# Patient Record
Sex: FEMALE | Race: WHITE | Employment: OTHER | URBAN - NONMETROPOLITAN AREA
[De-identification: names, ages, dates, MRNs, and addresses within clinical notes are randomized per-mention and may not be internally consistent; named-entity substitution may affect disease eponyms.]

---

## 2017-01-01 ENCOUNTER — HOSPITAL ENCOUNTER (OUTPATIENT)
Dept: LAB | Age: 67
Discharge: HOME | End: 2017-01-31
Attending: INTERNAL MEDICINE | Admitting: INTERNAL MEDICINE

## 2017-01-13 ENCOUNTER — TELEPHONE (OUTPATIENT)
Age: 67
End: 2017-01-13

## 2017-01-24 LAB
POC INR: 3.8 INDEX
PROTHROMBIN TIME, POC: 46.1 SECONDS (ref 10–14.3)

## 2017-02-01 ENCOUNTER — HOSPITAL ENCOUNTER (OUTPATIENT)
Dept: LAB | Age: 67
Discharge: HOME | End: 2017-02-28
Attending: INTERNAL MEDICINE | Admitting: INTERNAL MEDICINE

## 2017-02-07 LAB
POC INR: 3.3 INDEX
PROTHROMBIN TIME, POC: 39.7 SECONDS (ref 10–14.3)

## 2017-02-21 LAB
POC INR: 3.6 INDEX
PROTHROMBIN TIME, POC: 43.5 SECONDS (ref 10–14.3)

## 2017-02-28 LAB
POC INR: 4.1 INDEX
PROTHROMBIN TIME, POC: 49.2 SECONDS (ref 10–14.3)

## 2017-03-01 ENCOUNTER — HOSPITAL ENCOUNTER (OUTPATIENT)
Dept: LAB | Age: 67
Discharge: HOME | End: 2017-03-31
Attending: INTERNAL MEDICINE | Admitting: INTERNAL MEDICINE

## 2017-03-06 ENCOUNTER — OFFICE VISIT (OUTPATIENT)
Dept: CARDIOLOGY CLINIC | Age: 67
End: 2017-03-06

## 2017-03-06 VITALS
RESPIRATION RATE: 16 BRPM | HEIGHT: 62 IN | BODY MASS INDEX: 51.89 KG/M2 | DIASTOLIC BLOOD PRESSURE: 70 MMHG | SYSTOLIC BLOOD PRESSURE: 112 MMHG | HEART RATE: 80 BPM | WEIGHT: 282 LBS

## 2017-03-06 DIAGNOSIS — G47.33 OSA ON CPAP: ICD-10-CM

## 2017-03-06 DIAGNOSIS — I10 ESSENTIAL HYPERTENSION: ICD-10-CM

## 2017-03-06 DIAGNOSIS — Z98.84 S/P BARIATRIC SURGERY: ICD-10-CM

## 2017-03-06 DIAGNOSIS — Z99.89 OSA ON CPAP: ICD-10-CM

## 2017-03-06 DIAGNOSIS — I48.91 ATRIAL FIBRILLATION, UNSPECIFIED TYPE (HCC): Primary | ICD-10-CM

## 2017-03-06 DIAGNOSIS — E66.01 MORBID OBESITY DUE TO EXCESS CALORIES (HCC): ICD-10-CM

## 2017-03-06 PROCEDURE — 93000 ELECTROCARDIOGRAM COMPLETE: CPT | Performed by: INTERNAL MEDICINE

## 2017-03-06 PROCEDURE — 99214 OFFICE O/P EST MOD 30 MIN: CPT | Performed by: INTERNAL MEDICINE

## 2017-03-06 RX ORDER — ALLOPURINOL 300 MG/1
150 TABLET ORAL 2 TIMES DAILY
COMMUNITY

## 2017-03-07 LAB
POC INR: 3.6 INDEX
PROTHROMBIN TIME, POC: 43.5 SECONDS (ref 10–14.3)

## 2017-03-14 LAB
POC INR: 2.8 INDEX
PROTHROMBIN TIME, POC: 33.3 SECONDS (ref 10–14.3)

## 2017-03-28 ENCOUNTER — TELEPHONE (OUTPATIENT)
Age: 67
End: 2017-03-28

## 2017-03-31 LAB
POC INR: 2.2 INDEX
PROTHROMBIN TIME, POC: 26.5 SECONDS (ref 10–14.3)

## 2017-04-01 ENCOUNTER — HOSPITAL ENCOUNTER (OUTPATIENT)
Dept: LAB | Age: 67
Discharge: HOME | End: 2017-04-30
Attending: INTERNAL MEDICINE | Admitting: INTERNAL MEDICINE

## 2017-04-25 ENCOUNTER — ANTI-COAG VISIT (OUTPATIENT)
Age: 67
End: 2017-04-25

## 2017-04-25 DIAGNOSIS — I48.20 CHRONIC ATRIAL FIBRILLATION (HCC): ICD-10-CM

## 2017-04-25 LAB
INR BLD: 1.9
POC INR: 1.9 INDEX
PROTHROMBIN TIME, POC: 22.2 SECONDS (ref 10–14.3)
PROTIME: 22.2 SECONDS

## 2017-05-01 ENCOUNTER — HOSPITAL ENCOUNTER (OUTPATIENT)
Dept: LAB | Age: 67
Discharge: HOME | End: 2017-05-31
Attending: INTERNAL MEDICINE | Admitting: INTERNAL MEDICINE

## 2017-05-23 ENCOUNTER — TELEPHONE (OUTPATIENT)
Age: 67
End: 2017-05-23

## 2017-05-26 ENCOUNTER — ANTI-COAG VISIT (OUTPATIENT)
Age: 67
End: 2017-05-26

## 2017-05-26 DIAGNOSIS — I48.20 CHRONIC ATRIAL FIBRILLATION (HCC): ICD-10-CM

## 2017-05-26 LAB
INR BLD: 1.5
POC INR: 1.5 INDEX
PROTHROMBIN TIME, POC: 17.9 SECONDS (ref 10–14.3)
PROTIME: 17.9 SECONDS

## 2017-06-01 ENCOUNTER — HOSPITAL ENCOUNTER (OUTPATIENT)
Dept: LAB | Age: 67
Discharge: HOME | End: 2017-06-30
Attending: INTERNAL MEDICINE | Admitting: INTERNAL MEDICINE

## 2017-06-02 ENCOUNTER — ANTI-COAG VISIT (OUTPATIENT)
Age: 67
End: 2017-06-02

## 2017-06-02 DIAGNOSIS — I48.20 CHRONIC ATRIAL FIBRILLATION (HCC): ICD-10-CM

## 2017-06-02 LAB
INR BLD: 1.8
POC INR: 1.8 INDEX
PROTHROMBIN TIME, POC: 21.2 SECONDS (ref 10–14.3)
PROTIME: 21.2 SECONDS

## 2017-06-13 ENCOUNTER — ANTI-COAG VISIT (OUTPATIENT)
Age: 67
End: 2017-06-13

## 2017-06-13 DIAGNOSIS — I48.20 CHRONIC ATRIAL FIBRILLATION (HCC): ICD-10-CM

## 2017-06-13 LAB
INR BLD: 2.9
POC INR: 2.9 INDEX
PROTHROMBIN TIME, POC: 34.2 SECONDS (ref 10–14.3)
PROTIME: 34.2 SECONDS

## 2017-06-16 ENCOUNTER — TELEPHONE (OUTPATIENT)
Age: 67
End: 2017-06-16

## 2017-06-27 ENCOUNTER — ANTI-COAG VISIT (OUTPATIENT)
Age: 67
End: 2017-06-27

## 2017-06-27 DIAGNOSIS — I48.20 CHRONIC ATRIAL FIBRILLATION (HCC): ICD-10-CM

## 2017-06-27 LAB
INR BLD: 3
POC INR: 3 INDEX
PROTHROMBIN TIME, POC: 36.2 SECONDS (ref 10–14.3)
PROTIME: 36.2 SECONDS

## 2017-07-01 ENCOUNTER — HOSPITAL ENCOUNTER (OUTPATIENT)
Dept: LAB | Age: 67
Discharge: HOME | End: 2017-07-31
Attending: INTERNAL MEDICINE | Admitting: INTERNAL MEDICINE

## 2017-07-25 ENCOUNTER — ANTI-COAG VISIT (OUTPATIENT)
Age: 67
End: 2017-07-25

## 2017-07-25 DIAGNOSIS — I48.20 CHRONIC ATRIAL FIBRILLATION (HCC): ICD-10-CM

## 2017-07-25 LAB
INR BLD: 2.8
POC INR: 2.8 INDEX
PROTHROMBIN TIME, POC: 33.2 SECONDS (ref 10–14.3)
PROTIME: 33.2 SECONDS

## 2017-08-01 ENCOUNTER — HOSPITAL ENCOUNTER (OUTPATIENT)
Dept: LAB | Age: 67
Discharge: HOME | End: 2017-08-31
Attending: INTERNAL MEDICINE | Admitting: INTERNAL MEDICINE

## 2017-08-22 ENCOUNTER — ANTI-COAG VISIT (OUTPATIENT)
Age: 67
End: 2017-08-22

## 2017-08-22 DIAGNOSIS — I48.20 CHRONIC ATRIAL FIBRILLATION (HCC): ICD-10-CM

## 2017-08-22 LAB
INR BLD: 7.1
POC INR: 7.1 INDEX
PROTHROMBIN TIME, POC: 85.4 SECONDS (ref 10–14.3)
PROTIME: 85.4 SECONDS

## 2017-08-29 ENCOUNTER — ANTI-COAG VISIT (OUTPATIENT)
Age: 67
End: 2017-08-29

## 2017-08-29 DIAGNOSIS — I48.20 CHRONIC ATRIAL FIBRILLATION (HCC): ICD-10-CM

## 2017-08-29 LAB
INR BLD: 1.3
POC INR: 1.3 INDEX
PROTHROMBIN TIME, POC: 15.5 SECONDS (ref 10–14.3)
PROTIME: 15.5 SECONDS

## 2017-09-01 ENCOUNTER — HOSPITAL ENCOUNTER (OUTPATIENT)
Dept: LAB | Age: 67
Discharge: HOME | End: 2017-09-30
Attending: INTERNAL MEDICINE | Admitting: INTERNAL MEDICINE

## 2017-09-05 ENCOUNTER — ANTI-COAG VISIT (OUTPATIENT)
Age: 67
End: 2017-09-05

## 2017-09-05 DIAGNOSIS — I48.20 CHRONIC ATRIAL FIBRILLATION (HCC): ICD-10-CM

## 2017-09-05 LAB
INR BLD: 2.6
POC INR: 2.6 INDEX
PROTHROMBIN TIME, POC: 31 SECONDS (ref 10–14.3)
PROTIME: 31 SECONDS

## 2017-09-07 ENCOUNTER — OFFICE VISIT (OUTPATIENT)
Dept: SURGERY | Age: 67
End: 2017-09-07

## 2017-09-07 VITALS
SYSTOLIC BLOOD PRESSURE: 134 MMHG | HEART RATE: 71 BPM | DIASTOLIC BLOOD PRESSURE: 78 MMHG | HEIGHT: 60 IN | BODY MASS INDEX: 48.91 KG/M2 | WEIGHT: 249.12 LBS

## 2017-09-07 DIAGNOSIS — S80.12XA HEMATOMA OF LEG, LEFT, INITIAL ENCOUNTER: Primary | ICD-10-CM

## 2017-09-07 PROCEDURE — 99214 OFFICE O/P EST MOD 30 MIN: CPT | Performed by: SURGERY

## 2017-09-07 ASSESSMENT — ENCOUNTER SYMPTOMS
EYE REDNESS: 0
CONSTIPATION: 0
APNEA: 0
STRIDOR: 0
CHOKING: 0
SORE THROAT: 0
COLOR CHANGE: 0
BACK PAIN: 0
RECTAL PAIN: 0
EYE ITCHING: 0
PHOTOPHOBIA: 0
ANAL BLEEDING: 0

## 2017-09-12 ENCOUNTER — ANTI-COAG VISIT (OUTPATIENT)
Age: 67
End: 2017-09-12

## 2017-09-12 DIAGNOSIS — I48.20 CHRONIC ATRIAL FIBRILLATION (HCC): ICD-10-CM

## 2017-09-12 LAB
INR BLD: 3.5
POC INR: 3.5 INDEX
PROTHROMBIN TIME, POC: 41.5 SECONDS (ref 10–14.3)
PROTIME: 41.5 SECONDS

## 2017-09-20 ENCOUNTER — TELEPHONE (OUTPATIENT)
Dept: CARDIOLOGY CLINIC | Age: 67
End: 2017-09-20

## 2017-09-21 ENCOUNTER — HOSPITAL ENCOUNTER (OUTPATIENT)
Dept: SLEEP CENTER | Age: 67
Discharge: HOME | End: 2017-09-21
Attending: INTERNAL MEDICINE | Admitting: INTERNAL MEDICINE

## 2017-09-21 ENCOUNTER — OFFICE VISIT (OUTPATIENT)
Dept: SURGERY | Age: 67
End: 2017-09-21

## 2017-09-21 VITALS
BODY MASS INDEX: 47.12 KG/M2 | DIASTOLIC BLOOD PRESSURE: 73 MMHG | SYSTOLIC BLOOD PRESSURE: 133 MMHG | WEIGHT: 240 LBS | HEART RATE: 71 BPM | HEIGHT: 60 IN

## 2017-09-21 VITALS — WEIGHT: 247 LBS | HEIGHT: 62 IN | BODY MASS INDEX: 45.45 KG/M2

## 2017-09-21 DIAGNOSIS — S80.12XA HEMATOMA OF LEG, LEFT, INITIAL ENCOUNTER: Primary | ICD-10-CM

## 2017-09-21 PROCEDURE — 99214 OFFICE O/P EST MOD 30 MIN: CPT | Performed by: SURGERY

## 2017-09-21 ASSESSMENT — ENCOUNTER SYMPTOMS
COLOR CHANGE: 0
ANAL BLEEDING: 0
SORE THROAT: 0
APNEA: 0
EYE REDNESS: 0
CONSTIPATION: 0
BACK PAIN: 0
CHOKING: 0
RECTAL PAIN: 0
PHOTOPHOBIA: 0
EYE ITCHING: 0
STRIDOR: 0

## 2017-09-22 ENCOUNTER — TELEPHONE (OUTPATIENT)
Age: 67
End: 2017-09-22

## 2017-09-25 ENCOUNTER — HOSPITAL ENCOUNTER (OUTPATIENT)
Dept: PREADMISSION TESTING | Age: 67
Discharge: HOME | End: 2017-09-25
Attending: SURGERY | Admitting: SURGERY

## 2017-09-25 ENCOUNTER — TELEPHONE (OUTPATIENT)
Dept: CARDIOLOGY CLINIC | Age: 67
End: 2017-09-25

## 2017-09-25 VITALS
HEIGHT: 60 IN | SYSTOLIC BLOOD PRESSURE: 127 MMHG | DIASTOLIC BLOOD PRESSURE: 73 MMHG | TEMPERATURE: 97.8 F | BODY MASS INDEX: 48.49 KG/M2 | OXYGEN SATURATION: 98 % | HEART RATE: 85 BPM | RESPIRATION RATE: 16 BRPM | WEIGHT: 247 LBS

## 2017-09-25 LAB
ANION GAP SERPL CALCULATED.3IONS-SCNC: 15 MMOL/L (ref 4–16)
BASOPHILS ABSOLUTE: 0 K/CU MM
BASOPHILS RELATIVE PERCENT: 0.1 % (ref 0–1)
BUN BLDV-MCNC: 34 MG/DL (ref 6–23)
CALCIUM SERPL-MCNC: 9.5 MG/DL (ref 8.3–10.6)
CHLORIDE BLD-SCNC: 104 MMOL/L (ref 99–110)
CO2: 22 MMOL/L (ref 21–32)
CREAT SERPL-MCNC: 1.4 MG/DL (ref 0.6–1.1)
DIFFERENTIAL TYPE: ABNORMAL
EOSINOPHILS ABSOLUTE: 0 K/CU MM
EOSINOPHILS RELATIVE PERCENT: 0 % (ref 0–3)
GFR AFRICAN AMERICAN: 46 ML/MIN/1.73M2
GFR NON-AFRICAN AMERICAN: 38 ML/MIN/1.73M2
GLUCOSE BLD-MCNC: 91 MG/DL (ref 70–140)
HCT VFR BLD CALC: 38.7 % (ref 37–47)
HEMOGLOBIN: 12.1 GM/DL (ref 12.5–16)
IMMATURE NEUTROPHIL %: 0.4 % (ref 0–0.43)
LYMPHOCYTES ABSOLUTE: 1.4 K/CU MM
LYMPHOCYTES RELATIVE PERCENT: 16.2 % (ref 24–44)
MCH RBC QN AUTO: 31.7 PG (ref 27–31)
MCHC RBC AUTO-ENTMCNC: 31.3 % (ref 32–36)
MCV RBC AUTO: 101.3 FL (ref 78–100)
MONOCYTES ABSOLUTE: 0.7 K/CU MM
MONOCYTES RELATIVE PERCENT: 8.1 % (ref 0–4)
NUCLEATED RBC %: 0 %
PDW BLD-RTO: 16.9 % (ref 11.7–14.9)
PLATELET # BLD: 249 K/CU MM (ref 140–440)
PMV BLD AUTO: 10.1 FL (ref 7.5–11.1)
POTASSIUM SERPL-SCNC: 4.9 MMOL/L (ref 3.5–5.1)
RBC # BLD: 3.82 M/CU MM (ref 4.2–5.4)
SEGMENTED NEUTROPHILS ABSOLUTE COUNT: 6.4 K/CU MM
SEGMENTED NEUTROPHILS RELATIVE PERCENT: 75.2 % (ref 36–66)
SODIUM BLD-SCNC: 141 MMOL/L (ref 135–145)
TOTAL IMMATURE NEUTOROPHIL: 0.03 K/CU MM
TOTAL NUCLEATED RBC: 0 K/CU MM
WBC # BLD: 8.6 K/CU MM (ref 4–10.5)

## 2017-09-25 RX ORDER — AMOXICILLIN AND CLAVULANATE POTASSIUM 875; 125 MG/1; MG/1
1 TABLET, FILM COATED ORAL 2 TIMES DAILY
COMMUNITY
End: 2017-10-02 | Stop reason: ALTCHOICE

## 2017-09-25 RX ORDER — METOPROLOL TARTRATE 50 MG/1
25 TABLET, FILM COATED ORAL 2 TIMES DAILY
COMMUNITY
End: 2017-10-10 | Stop reason: ALTCHOICE

## 2017-09-25 RX ORDER — DILTIAZEM HYDROCHLORIDE 180 MG/1
180 CAPSULE, COATED, EXTENDED RELEASE ORAL DAILY
COMMUNITY

## 2017-09-25 RX ORDER — WARFARIN SODIUM 2.5 MG/1
3.5 TABLET ORAL
COMMUNITY
End: 2018-02-01

## 2017-09-25 RX ORDER — WARFARIN SODIUM 2.5 MG/1
TABLET ORAL
COMMUNITY
End: 2018-02-01

## 2017-09-25 RX ORDER — SULFAMETHOXAZOLE AND TRIMETHOPRIM 400; 80 MG/1; MG/1
1 TABLET ORAL 2 TIMES DAILY
COMMUNITY
End: 2017-10-02 | Stop reason: ALTCHOICE

## 2017-09-25 ASSESSMENT — PAIN SCALES - GENERAL: PAINLEVEL_OUTOF10: 0

## 2017-09-26 LAB
EKG ATRIAL RATE: 375 BPM
EKG DIAGNOSIS: NORMAL
EKG Q-T INTERVAL: 396 MS
EKG QRS DURATION: 84 MS
EKG QTC CALCULATION (BAZETT): 427 MS
EKG R AXIS: 31 DEGREES
EKG T AXIS: 166 DEGREES
EKG VENTRICULAR RATE: 70 BPM

## 2017-10-01 ENCOUNTER — HOSPITAL ENCOUNTER (OUTPATIENT)
Dept: LAB | Age: 67
Discharge: HOME | End: 2017-10-31
Attending: INTERNAL MEDICINE | Admitting: INTERNAL MEDICINE

## 2017-10-02 ENCOUNTER — OFFICE VISIT (OUTPATIENT)
Dept: CARDIOLOGY CLINIC | Age: 67
End: 2017-10-02

## 2017-10-02 VITALS
HEIGHT: 62 IN | DIASTOLIC BLOOD PRESSURE: 64 MMHG | BODY MASS INDEX: 43.43 KG/M2 | HEART RATE: 76 BPM | RESPIRATION RATE: 16 BRPM | WEIGHT: 236 LBS | SYSTOLIC BLOOD PRESSURE: 100 MMHG

## 2017-10-02 DIAGNOSIS — Z99.89 OSA ON CPAP: ICD-10-CM

## 2017-10-02 DIAGNOSIS — I10 ESSENTIAL HYPERTENSION: ICD-10-CM

## 2017-10-02 DIAGNOSIS — G47.33 OSA ON CPAP: ICD-10-CM

## 2017-10-02 DIAGNOSIS — E78.2 MIXED HYPERLIPIDEMIA: ICD-10-CM

## 2017-10-02 DIAGNOSIS — Z98.84 S/P BARIATRIC SURGERY: ICD-10-CM

## 2017-10-02 DIAGNOSIS — I48.20 CHRONIC ATRIAL FIBRILLATION (HCC): Primary | ICD-10-CM

## 2017-10-02 PROCEDURE — 99214 OFFICE O/P EST MOD 30 MIN: CPT | Performed by: INTERNAL MEDICINE

## 2017-10-02 RX ORDER — MONTELUKAST SODIUM 10 MG/1
10 TABLET ORAL NIGHTLY
COMMUNITY

## 2017-10-02 NOTE — ASSESSMENT & PLAN NOTE
Patient is to bring us the most recent lab results which are followed by primary care physician until then to continue current dose of statin.

## 2017-10-02 NOTE — PATIENT INSTRUCTIONS
[id number] Score for Atrial Fibrillation Stroke Risk   Risk   Factors  Component Value   C CHF Yes 1   H HTN Yes 1   A2 Age >= 76 No,  (69 y.o.) 0   D DM No 0   S2 Prior Stroke/TIA No 0   V Vascular Disease No 0   A Age 74-69 Yes,  (69 y.o.) 1   Sc Sex female 1    [id number]  Score  4   Score last updated 71/9/00 8:78 PM    Click here for a link to the UpToDate guideline \"Atrial Fibrillation: Anticoagulation therapy to prevent embolization    Disclaimer: Risk Score calculation is dependent on accuracy of patient problem list and past encounter diagnosis. Discontinue Lopressor and continue other current medications. Resume Warfarin at a lower dose of 2.5 mg daily and have PT/INR next Tuesday. Follow up with coumadin clinic. Appropriate prescriptions if needed on this visit are addressed. After visit summery is provided. Questions answered and patient verbalizes understanding. Follow up in office in 2 weeks with a 24 hour Holter,  sooner if needed.

## 2017-10-02 NOTE — PROGRESS NOTES
11/2013); Lithotripsy (9/6/2012); Finger surgery (Left, 2012); Skin graft (2016); parathyroidectomy (2006); other surgical history (01/2016); joint replacement (2011); Skin graft (1960's); Knee arthroscopy (Bilateral, 1990's); Bariatric Surgery (12/2016); [place] tooth extraction; other surgical history (Left, 09/26/2017); and other surgical history (09/26/2017). Social History:   Social History   Substance Use Topics    Smoking status: Passive Smoke Exposure - Never Smoker    Smokeless tobacco: Never Used    Alcohol use No     Family history: family history includes Diabetes in her mother; Heart Failure in her father; Obesity in her brother and sister; Other in her brother. ALLERGIES:  Digoxin and related and Sulfa antibiotics  Prior to Admission medications    Medication Sig Start Date End Date Taking? Authorizing Provider   Multiple Vitamins-Minerals (HAIR SKIN AND NAILS FORMULA) TABS Take by mouth   Yes Historical Provider, MD   montelukast (SINGULAIR) 10 MG tablet Take 10 mg by mouth nightly   Yes Historical Provider, MD   ibuprofen (ADVIL) 200 MG tablet Take 2 tablets by mouth every 6 hours as needed for Pain 9/26/17  Yes Wrens [place], MD   metoprolol tartrate (LOPRESSOR) 50 MG tablet Take 25 mg by mouth 2 times daily   Yes Historical Provider, MD   warfarin (COUMADIN) 2.5 MG tablet Take 2.5 mg by mouth daily On Tues. , Thurs. , Saturday and Sunday   Yes Historical Provider, MD   warfarin (COUMADIN) 2.5 MG tablet Take 3.5 mg by mouth Takes 2.5mg plus 1mg additional on Mon, Wed and Friday   Yes Historical Provider, MD   diltiazem (CARDIZEM CD) 180 MG extended release capsule Take 180 mg by mouth daily   Yes Historical Provider, MD   Cholecalciferol (VITAMIN D3) 5000 units TABS Take 1 tablet by mouth daily   Yes Historical Provider, MD   Biotin 5000 MCG CAPS Take 1 capsule by mouth daily   Yes Historical Provider, MD   Coenzyme Q10-Fish Oil-Vit E (CO-Q 10 OMEGA-3 FISH OIL) CAPS Take 600 mg by mouth daily With [facility]   Yes Historical Provider, MD   allopurinol (ZYLOPRIM) 300 MG tablet Take 300 mg by mouth 2 times daily   Yes Historical Provider, MD   ketoconazole (NIZORAL) 2 % cream Apply topically daily Apply topically daily. Yes Historical Provider, MD   Probiotic Product (FLORAJEN BIFIDOBLEND PO) Take by mouth daily    Yes Historical Provider, MD   naproxen (NAPROSYN) 500 MG tablet Take 500 mg by mouth 2 times daily (with meals)   Yes Historical Provider, MD   aclidinium (TUDORZA PRESSAIR) 400 MCG/ACT AEPB inhaler Inhale 1 puff into the lungs 2 times daily   Yes Historical Provider, MD   BREROMEO ELLIPTA 100-25 MCG/INH AEPB INHALE ONE DOSE BY MOUTH DAILY 6/6/16  Yes Nelia Tejeda MD   albuterol sulfate  (90 BASE) MCG/ACT inhaler Inhale 2 puffs into the lungs every 4 hours as needed for Wheezing or Shortness of Breath 5/2/16  Yes Nelia Tejeda MD   pravastatin (PRAVACHOL) 40 MG tablet Take 20 mg by mouth nightly  2/29/16  Yes Historical Provider, MD   clotrimazole-betamethasone (LOTRISONE) 1-0.05 % cream Apply topically 2 times daily. 2/9/16  Yes Christina Cat MD   azelastine HCl 0.15 % SOLN 2 sprays by Nasal route daily  Patient taking differently: 2 sprays by Nasal route nightly  1/28/16  Yes Nelia Tejeda MD   omeprazole (PRILOSEC) 20 MG capsule Take 20 mg by mouth daily. Yes Historical Provider, MD     Constitutional:  /64 (Site: Left Arm, Position: Sitting, Cuff Size: Large Adult)  Pulse 76  Resp 16  Ht 5' 2\" (1.575 m)  Wt 236 lb (107 kg)  BMI 43.16 kg/m2   Body mass index is 43.16 kg/(m^2). Wt Readings from Last 3 Encounters:   10/02/17 236 lb (107 kg)   09/25/17 247 lb (112 kg)   09/21/17 247 lb (112 kg)       General exam: Obese, awake, alert and oriented and in no acute or apparent distress.     Head and Neck: Normocephalic. Neck is supple . Wears glasses     Carotids: no Bruits. No thyromegaly  Jugular venous pressure: not elevated.   Heart[de-identified] Heart sounds are distant otherwise normal. No murmurs or gallop  Peripheral Pulses: 1+  Extremities: No edema. Left knee incision is healing well and there is no bleeding. The dressing is dry. Chest: Normal  Lungs:Lungs are clear to auscultation and percussion. Abdomen: Soft non tender. Bowel sounds are normal. No organomegaly or ascites. All the ports from surgery healing well  Musculoskeletal: WNL  Skin: Normal in color and texture. No rash    ECG from September 25 showed atrial fibrillation rate was 70 bpm.    LAB REVIEW:  CBC:   Lab Results   Component Value Date    WBC 8.6 09/25/2017    HGB 12.1 09/25/2017    HCT 38.7 09/25/2017     09/25/2017     Lipids:   Lab Results   Component Value Date    CHOL 165 03/24/2014    TRIG 228 03/24/2014    HDL 33 (A) 03/24/2014    LDLCALC 86 03/24/2014    LDLDIRECT 64 11/30/2012     Renal:   Lab Results   Component Value Date    BUN 34 09/25/2017    CREATININE 1.4 09/25/2017     09/25/2017    K 4.9 09/25/2017     PT/INR:   Lab Results   Component Value Date    INR 3.5 09/12/2017       IMPRESSION and RECOMMENDATIONS:      S/P bariatric surgery  Continue to lose weight and feels much better since surgery. We will continue to adjust her medications for her blood pressure and rate control until she plateaus the weight loss. MURTAZA on CPAP  Continue using CPAP    Hyperlipidemia  Patient is to bring us the most recent lab results which are followed by primary care physician until then to continue current dose of statin. Hypertension  Low normal on current medications. We will discontinue Lopressor as she is symptomatic with dizzy spells and I will also obtain a 24-hour Holter monitor to assess rate control off of Lopressor. Atrial fibrillation  Rate is well controlled as per vital vital signs on recent electrocardiogram.  We will evaluated further by 24-hour Holter monitor as she has symptoms of dizzy spells.   She is to resume warfarin and follow up at Coumadin clinic

## 2017-10-02 NOTE — MR AVS SNAPSHOT
After Visit Summary             Giulia Dodson   10/2/2017 3:20 PM   Office Visit    Description:  Female : 1950   Provider:  Michael Espinoza MD   Department:  Cardiology [facility] and Future Appointments         Below is a list of your follow-up and future appointments. This may not be a complete list as you may have made appointments directly with providers that we are not aware of or your providers may have made some for you. Please call your providers to confirm appointments. It is important to keep your appointments. Please bring your current insurance card, photo ID, co-pay, and all medication bottles to your appointment. If self-pay, payment is expected at the time of service. Your To-Do List     Future Appointments Provider Department Dept Phone    10/3/2017 3:45 PM [address] [place] [address] [phone]    10/5/2017 2:00 PM Den Mak MD [address] [phone]    10/9/2017 1:00 PM SCHEDULE, [place] [address] Cardiology [address] [place] [address] [phone]    If this is a fasting lab, please do not eat or drink past midnight other than water. 10/19/2017 2:30 PM Melisa Ge MD [facility] [phone]    Please arrive 15 minutes prior to appointment, bring photo ID and insurance card. 3/5/2018 10:30 AM Michael Espinoza MD Cardiology [address] [place] [address] Drive [phone]    Please arrive 15 minutes prior to appointment, bring photo ID and insurance card. 2018 9:30 AM Michael Espinoza MD Cardiology [address] [place] [address] Drive [phone]    Please arrive 15 minutes prior to appointment, bring photo ID and insurance card. Future Orders Complete By Expires    Holter Monitor 24 Hour [CAR4 Custom]  10/9/2017 (Approximate) 10/2/2018    Follow-Up    Return in about 6 months (around 2018).          Information from Your Visit        Department Name Address Phone Fax    Cardiology [address] [facility] [address] [phone]      You Were Seen for:         Comments    Chronic atrial fibrillation [facility])   [[id number]]         Vital Signs     Blood Pressure Pulse Respirations Height Weight Body Mass Index    100/64 (Site: Left Arm, Position: Sitting, Cuff Size: Large Adult) 76 16 5' 2\" (1.575 m) 236 lb (107 kg) 43.16 kg/m2    Smoking Status                   Passive Smoke Exposure - Never Smoker           Additional Information about your Body Mass Index (BMI)           Your BMI as listed above is considered obese (30 or more). BMI is an estimate of body fat, calculated from your height and weight. The higher your BMI, the greater your risk of heart disease, high blood pressure, type 2 diabetes, stroke, gallstones, arthritis, sleep apnea, and certain cancers. BMI is not perfect. It may overestimate body fat in athletes and people who are more muscular. Even a small weight loss (between 5 and 10 percent of your current weight) by decreasing your calorie intake and becoming more physically active will help lower your risk of developing or worsening diseases associated with obesity. Learn more at: [employer]my.co.uk          Instructions    [id number] Score for Atrial Fibrillation Stroke Risk   Risk   Factors  Component Value   C CHF Yes 1   H HTN Yes 1   A2 Age >= 76 No,  (69 y.o.) 0   D DM No 0   S2 Prior Stroke/TIA No 0   V Vascular Disease No 0   A Age 74-69 Yes,  (69 y.o.) 1   Sc Sex female 1    [id number]  Score  4   Score last updated 32/8/97 9:50 PM    Click here for a link to the UpToDate guideline \"Atrial Fibrillation: Anticoagulation therapy to prevent embolization    Disclaimer: Risk Score calculation is dependent on accuracy of patient problem list and past encounter diagnosis. Discontinue Lopressor and continue other current medications.  Resume Warfarin at a lower dose of 2.5 mg daily and have PT/INR next Tuesday. Follow up with coumadin clinic. Appropriate prescriptions if needed on this visit are addressed. After visit carissa is provided. Questions answered and patient verbalizes understanding. Follow up in office in 2 weeks with a 24 hour Holter,  sooner if needed. Today's Medication Changes          These changes are accurate as of: 10/2/17  4:34 PM.  If you have any questions, ask your nurse or doctor. STOP taking these medications           amoxicillin-clavulanate 875-125 MG per tablet   Commonly known as:  AUGMENTIN   Stopped by: Halley Rossi MD       sulfamethoxazole-trimethoprim 400-80 MG per tablet   Commonly known as:  BACTRIM;SEPTRA   Stopped by: Halley Rossi MD               Your Current Medications Are              Multiple Vitamins-Minerals (HAIR SKIN AND NAILS FORMULA) TABS Take by mouth    montelukast (SINGULAIR) 10 MG tablet Take 10 mg by mouth nightly    ibuprofen (ADVIL) 200 MG tablet Take 2 tablets by mouth every 6 hours as needed for Pain    metoprolol tartrate (LOPRESSOR) 50 MG tablet Take 25 mg by mouth 2 times daily    warfarin (COUMADIN) 2.5 MG tablet Take 2.5 mg by mouth daily On Tues. , Thurs. , Saturday and Sunday    warfarin (COUMADIN) 2.5 MG tablet Take 3.5 mg by mouth Takes 2.5mg plus 1mg additional on Mon, Wed and Friday    diltiazem (CARDIZEM CD) 180 MG extended release capsule Take 180 mg by mouth daily    Cholecalciferol (VITAMIN D3) 5000 units TABS Take 1 tablet by mouth daily    Biotin 5000 MCG CAPS Take 1 capsule by mouth daily    Coenzyme Q10-Fish Oil-Vit E (CO-Q 10 OMEGA-3 FISH OIL) CAPS Take 600 mg by mouth daily With Krill oil    allopurinol (ZYLOPRIM) 300 MG tablet Take 300 mg by mouth 2 times daily    ketoconazole (NIZORAL) 2 % cream Apply topically daily Apply topically daily.     Probiotic Product (FLORAJEN BIFIDOBLEND PO) Take by mouth daily

## 2017-10-02 NOTE — ASSESSMENT & PLAN NOTE
Low normal on current medications. We will discontinue Lopressor as she is symptomatic with dizzy spells and I will also obtain a 24-hour Holter monitor to assess rate control off of Lopressor.

## 2017-10-03 ENCOUNTER — TELEPHONE (OUTPATIENT)
Age: 67
End: 2017-10-03

## 2017-10-03 NOTE — TELEPHONE ENCOUNTER
Patient left voicemail to cancel appointment. Patient states she was off warfarin for 1 week. States she saw Dr. Derik Padilla, who instructed her to take warfarin 2.5mg daily until she returns to clinic. Requested appointment for Tuesday 10/10 per request of Dr. Derik Padilla to be seen within a week of restarting warfarin. Scheduled for 10/10.

## 2017-10-05 ENCOUNTER — OFFICE VISIT (OUTPATIENT)
Dept: SURGERY | Age: 67
End: 2017-10-05

## 2017-10-05 VITALS
HEART RATE: 84 BPM | WEIGHT: 234 LBS | SYSTOLIC BLOOD PRESSURE: 136 MMHG | BODY MASS INDEX: 45.94 KG/M2 | DIASTOLIC BLOOD PRESSURE: 78 MMHG | HEIGHT: 60 IN

## 2017-10-05 DIAGNOSIS — S80.12XA HEMATOMA OF LEG, LEFT, INITIAL ENCOUNTER: Primary | ICD-10-CM

## 2017-10-05 PROCEDURE — 99213 OFFICE O/P EST LOW 20 MIN: CPT | Performed by: SURGERY

## 2017-10-05 NOTE — PROGRESS NOTES
Chief Complaint   Patient presents with    Post-Op Check     L Leg  I&D          SUBJECTIVE:  Patient here for post op visit s/p left leg I&D @ [facility] 9/26/2017. Patient denies pain . No bruising. Wound  is healing. Pain is minimal.  Wounds: min bruising and no discharge. OBJECTIVE:  Physical Exam    Wound well healed without signs of active infection. Suture line intact. Abdomen soft, nontender, nondistended. Culture is negative    ASSESSMENT:  Patient doingwell on this post operative check. Wounds well healed. Sutures removed  1. Hematoma of leg, left, initial encounter        PLAN:  Continue same  Increase activity as tolerated        No orders of the defined types were placed in this encounter. No orders of the defined types were placed in this encounter. Follow Up: Return if symptoms worsen or fail to improve.     Mirela Rosario MD

## 2017-10-05 NOTE — MR AVS SNAPSHOT
After Visit Summary             Shanda Dodson   10/5/2017 2:00 PM   Office Visit    Description:  Female : 1950   Provider:  Charlie Alvarado MD   Department:  [id number] and Future Appointments         Below is a list of your follow-up and future appointments. This may not be a complete list as you may have made appointments directly with providers that we are not aware of or your providers may have made some for you. Please call your providers to confirm appointments. It is important to keep your appointments. Please bring your current insurance card, photo ID, co-pay, and all medication bottles to your appointment. If self-pay, payment is expected at the time of service. Your To-Do List     Future Appointments Provider Department Dept Phone    10/9/2017 1:00 PM SCHEDULE, Jamil Nevarez Cardiology [address] [place] [address] Drive [phone]    If this is a fasting lab, please do not eat or drink past midnight other than water. 10/10/2017 5:00 PM [facility] [phone]    10/19/2017 2:30 PM [address] [facility] MD [facility] [phone]    Please arrive 15 minutes prior to appointment, bring photo ID and insurance card. 2018 9:30 AM Aamir Begum MD Cardiology [address] [place] [address] Drive [phone]    Please arrive 15 minutes prior to appointment, bring photo ID and insurance card. Information from Your Visit        Department     Name Address Phone Fax    [address] [facility] [phone]      Vital Signs     Blood Pressure Pulse Height Weight Body Mass Index Smoking Status    136/78 84 5' (1.524 m) 234 lb (106.1 kg) 45.7 kg/m2 Passive Smoke Exposure - Never Smoker      Additional Information about your Body Mass Index (BMI)           Your BMI as listed above is considered obese (30 or more).  BMI is an estimate of body fat, calculated from your height and weight. The higher your BMI, the greater your risk of heart disease, high blood pressure, type 2 diabetes, stroke, gallstones, arthritis, sleep apnea, and certain cancers. BMI is not perfect. It may overestimate body fat in athletes and people who are more muscular. Even a small weight loss (between 5 and 10 percent of your current weight) by decreasing your calorie intake and becoming more physically active will help lower your risk of developing or worsening diseases associated with obesity. Learn more at: [employer]             Medications and Orders      Your Current Medications Are              Multiple Vitamins-Minerals (HAIR SKIN AND NAILS FORMULA) TABS Take by mouth    montelukast (SINGULAIR) 10 MG tablet Take 10 mg by mouth nightly    ibuprofen (ADVIL) 200 MG tablet Take 2 tablets by mouth every 6 hours as needed for Pain    metoprolol tartrate (LOPRESSOR) 50 MG tablet Take 25 mg by mouth 2 times daily    warfarin (COUMADIN) 2.5 MG tablet Take 2.5 mg by mouth daily On Tues. , Thurs. , Saturday and Sunday    warfarin (COUMADIN) 2.5 MG tablet Take 3.5 mg by mouth Takes 2.5mg plus 1mg additional on Mon, Wed and Friday    diltiazem (CARDIZEM CD) 180 MG extended release capsule Take 180 mg by mouth daily    Cholecalciferol (VITAMIN D3) 5000 units TABS Take 1 tablet by mouth daily    Biotin 5000 MCG CAPS Take 1 capsule by mouth daily    Coenzyme Q10-Fish Oil-Vit E (CO-Q 10 OMEGA-3 FISH OIL) CAPS Take 600 mg by mouth daily With Krill oil    allopurinol (ZYLOPRIM) 300 MG tablet Take 300 mg by mouth 2 times daily    ketoconazole (NIZORAL) 2 % cream Apply topically daily Apply topically daily.     Probiotic Product (FLORAJEN BIFIDOBLEND PO) Take by mouth daily     naproxen (NAPROSYN) 500 MG tablet Take 500 mg by mouth 2 times daily (with meals) aclidinium (TUDORZA PRESSAIR) 400 MCG/ACT AEPB inhaler Inhale 1 puff into the lungs 2 times daily    BREO ELLIPTA 100-25 MCG/INH AEPB INHALE ONE DOSE BY MOUTH DAILY    albuterol sulfate  (90 BASE) MCG/ACT inhaler Inhale 2 puffs into the lungs every 4 hours as needed for Wheezing or Shortness of Breath    pravastatin (PRAVACHOL) 40 MG tablet Take 20 mg by mouth nightly     clotrimazole-betamethasone (LOTRISONE) 1-0.05 % cream Apply topically 2 times daily. azelastine HCl 0.15 % SOLN 2 sprays by Nasal route daily    omeprazole (PRILOSEC) 20 MG capsule Take 20 mg by mouth daily. 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  1950    Tetanus Combination Vaccine (1 - Tdap) 4/10/1970    Mammograms are recommended every 2 years for low/average risk patients aged 48 - 69, and every year for high risk patients per updated national

## 2017-10-06 ENCOUNTER — TELEPHONE (OUTPATIENT)
Dept: CARDIOLOGY CLINIC | Age: 67
End: 2017-10-06

## 2017-10-09 ENCOUNTER — NURSE ONLY (OUTPATIENT)
Dept: CARDIOLOGY CLINIC | Age: 67
End: 2017-10-09

## 2017-10-09 DIAGNOSIS — I48.20 CHRONIC ATRIAL FIBRILLATION (HCC): ICD-10-CM

## 2017-10-09 DIAGNOSIS — I49.9 IRREGULAR HEART RATE: Primary | ICD-10-CM

## 2017-10-09 PROCEDURE — 93225 XTRNL ECG REC<48 HRS REC: CPT | Performed by: INTERNAL MEDICINE

## 2017-10-10 ENCOUNTER — ANTI-COAG VISIT (OUTPATIENT)
Age: 67
End: 2017-10-10

## 2017-10-10 DIAGNOSIS — I48.20 CHRONIC ATRIAL FIBRILLATION (HCC): ICD-10-CM

## 2017-10-10 LAB
INR BLD: 1.2
POC INR: 1.2 INDEX
PROTHROMBIN TIME, POC: 14.1 SECONDS (ref 10–14.3)
PROTIME: 14.1 SECONDS

## 2017-10-12 ENCOUNTER — TELEPHONE (OUTPATIENT)
Dept: CARDIOLOGY CLINIC | Age: 67
End: 2017-10-12

## 2017-10-12 PROCEDURE — 93227 XTRNL ECG REC<48 HR R&I: CPT | Performed by: INTERNAL MEDICINE

## 2017-10-17 ENCOUNTER — ANTI-COAG VISIT (OUTPATIENT)
Age: 67
End: 2017-10-17

## 2017-10-17 DIAGNOSIS — I48.20 CHRONIC ATRIAL FIBRILLATION (HCC): ICD-10-CM

## 2017-10-17 LAB
INR BLD: 1.9
POC INR: 1.9 INDEX
PROTHROMBIN TIME, POC: 22.8 SECONDS (ref 10–14.3)
PROTIME: 22.8 SECONDS

## 2017-10-17 NOTE — PROGRESS NOTES
Patient verifies current dosing regimen as listed above. Patient denies any missed or extra doses. Patient denies any unusual bruising/bleeding/swelling/SOB. Patient denies changes in diet involving vitamin K. Patient denies any changes in prescription/OTC/herbal medications. Patient denies recent falls. Increase dose by ~5% to warfarin 3.5mg daily except 2.5mg MWF and return to clinic in 2 weeks. Dosing reminder sheet given with phone number, appointment date, and time.

## 2017-10-31 ENCOUNTER — ANTI-COAG VISIT (OUTPATIENT)
Age: 67
End: 2017-10-31

## 2017-10-31 DIAGNOSIS — I48.20 CHRONIC ATRIAL FIBRILLATION (HCC): ICD-10-CM

## 2017-10-31 LAB
INR BLD: 3.6
POC INR: 3.6 INDEX
PROTHROMBIN TIME, POC: 43.7 SECONDS (ref 10–14.3)
PROTIME: 43.7 SECONDS

## 2017-10-31 NOTE — PROGRESS NOTES
Patient verifies current dosing regimen as listed above. Patient denies any missed or extra doses. Patient denies any unusual bruising/bleeding/swelling/SOB. Patient denies changes in diet involving vitamin K. Patient denies any changes in OTC/herbal medications. Patient completed Medrol Dose Baldomero for hives. Likely reason INR supratherapeutic. Patient denies recent falls. Take warfarin 1.5mg x 1 then continue warfarin 3.5mg daily except 2.5mg MWF and return to clinic in 2.5 weeks. Dosing reminder sheet given with phone number, appointment date, and time.

## 2017-11-01 ENCOUNTER — HOSPITAL ENCOUNTER (OUTPATIENT)
Dept: LAB | Age: 67
Discharge: HOME | End: 2017-11-30
Attending: INTERNAL MEDICINE | Admitting: INTERNAL MEDICINE

## 2017-11-14 ENCOUNTER — TELEPHONE (OUTPATIENT)
Age: 67
End: 2017-11-14

## 2017-11-14 NOTE — TELEPHONE ENCOUNTER
Patient called to let clinic know she is now on Cipro, but she is unable to attend her previously scheduled appointment 11/17. Patient needs to reschedule. Called patient back on home and cell numbers. No answer. Left .     Scar Vernon, PharmD, [facility]  11/14/2017  4:54 PM

## 2017-11-21 ENCOUNTER — ANTI-COAG VISIT (OUTPATIENT)
Age: 67
End: 2017-11-21

## 2017-11-21 DIAGNOSIS — I48.20 CHRONIC ATRIAL FIBRILLATION (HCC): ICD-10-CM

## 2017-11-21 LAB
INR BLD: 2.5
POC INR: 2.5 INDEX
PROTHROMBIN TIME, POC: 30.4 SECONDS (ref 10–14.3)
PROTIME: 30.4 SECONDS

## 2017-12-01 ENCOUNTER — HOSPITAL ENCOUNTER (OUTPATIENT)
Dept: LAB | Age: 67
Discharge: HOME | End: 2017-12-31
Attending: INTERNAL MEDICINE | Admitting: INTERNAL MEDICINE

## 2017-12-19 ENCOUNTER — ANTI-COAG VISIT (OUTPATIENT)
Age: 67
End: 2017-12-19

## 2017-12-19 DIAGNOSIS — I48.20 CHRONIC ATRIAL FIBRILLATION (HCC): ICD-10-CM

## 2017-12-19 LAB
INR BLD: 2.1
POC INR: 2.1 INDEX
PROTHROMBIN TIME, POC: 24.8 SECONDS (ref 10–14.3)
PROTIME: 24.8 SECONDS

## 2018-01-01 ENCOUNTER — HOSPITAL ENCOUNTER (OUTPATIENT)
Dept: LAB | Age: 68
Discharge: HOME | End: 2018-01-31
Attending: INTERNAL MEDICINE | Admitting: INTERNAL MEDICINE

## 2018-01-16 ENCOUNTER — ANTI-COAG VISIT (OUTPATIENT)
Age: 68
End: 2018-01-16

## 2018-01-16 ENCOUNTER — TELEPHONE (OUTPATIENT)
Age: 68
End: 2018-01-16

## 2018-01-16 DIAGNOSIS — I48.20 CHRONIC ATRIAL FIBRILLATION (HCC): ICD-10-CM

## 2018-01-16 LAB
INR BLD: 2.3
POC INR: 2.3 INDEX
PROTHROMBIN TIME, POC: 27.9 SECONDS (ref 10–14.3)
PROTIME: 27.9 SECONDS

## 2018-01-16 NOTE — PROGRESS NOTES
Patient verifies current dosing regimen as listed above. Patient denies any missed or extra doses. Patient denies any unusual bruising/bleeding/swelling/SOB. Patient denies changes in diet involving vitamin K. Patient denies any changes in prescription/OTC/herbal medications. Patient reports falling over dog, but denies hitting head. Continue warfarin 3.5mg daily except 2.5mg on MWF and return to clinic in 4 weeks. Dosing reminder sheet given with phone number, appointment date, and time.

## 2018-01-19 PROBLEM — L02.416 ABSCESS OF KNEE, LEFT: Status: ACTIVE | Noted: 2018-01-19

## 2018-02-01 ENCOUNTER — HOSPITAL ENCOUNTER (OUTPATIENT)
Dept: LAB | Age: 68
Discharge: HOME | End: 2018-02-28
Attending: INTERNAL MEDICINE | Admitting: INTERNAL MEDICINE

## 2018-02-06 ENCOUNTER — TELEPHONE (OUTPATIENT)
Dept: CARDIOLOGY CLINIC | Age: 68
End: 2018-02-06

## 2018-02-06 ENCOUNTER — TELEPHONE (OUTPATIENT)
Age: 68
End: 2018-02-06

## 2018-02-06 NOTE — TELEPHONE ENCOUNTER
Dr. Jonnathan Yeager completed cardiac clearance form and cleared patient to proceed with her procedure. Form faxed to [facility] at [phone]; see copy of form attached.

## 2018-02-08 ENCOUNTER — HOSPITAL ENCOUNTER (OUTPATIENT)
Dept: NUCLEAR MEDICINE | Age: 68
Discharge: HOME | End: 2018-02-08
Attending: SURGERY | Admitting: SURGERY

## 2018-02-08 DIAGNOSIS — M25.062 HEMARTHROSIS INVOLVING KNEE JOINT, LEFT: ICD-10-CM

## 2018-02-08 DIAGNOSIS — M25.562 PAIN IN LEFT KNEE: ICD-10-CM

## 2018-02-08 DIAGNOSIS — M25.562 LEFT KNEE PAIN, UNSPECIFIED CHRONICITY: ICD-10-CM

## 2018-02-08 DIAGNOSIS — M25.062 HEMARTHROSIS OF LEFT KNEE: ICD-10-CM

## 2018-02-08 RX ORDER — TC 99M MEDRONATE 20 MG/10ML
25 INJECTION, POWDER, LYOPHILIZED, FOR SOLUTION INTRAVENOUS
Status: COMPLETED | OUTPATIENT
Start: 2018-02-08 | End: 2018-02-08

## 2018-02-08 RX ADMIN — TC 99M MEDRONATE 25 MILLICURIE: 20 INJECTION, POWDER, LYOPHILIZED, FOR SOLUTION INTRAVENOUS at 09:30

## 2018-06-13 ENCOUNTER — HOSPITAL ENCOUNTER (OUTPATIENT)
Dept: OTHER | Age: 68
Discharge: HOME | End: 2018-06-13
Attending: UROLOGY | Admitting: UROLOGY

## 2018-06-18 LAB
STONE COMPOSITION: NORMAL
STONE DESCRIPTION: NORMAL
STONE MASS: 511
STONE NUMBER: NORMAL
STONE SIZE: NORMAL

## 2019-02-15 LAB
AVERAGE GLUCOSE: NORMAL
HBA1C MFR BLD: 5.6 %

## 2019-03-21 ENCOUNTER — OFFICE VISIT (OUTPATIENT)
Dept: PHYSICAL MEDICINE AND REHAB | Age: 69
End: 2019-03-21
Payer: MEDICARE

## 2019-03-21 DIAGNOSIS — R20.2 PARESTHESIA OF BOTH FEET: ICD-10-CM

## 2019-03-21 DIAGNOSIS — M54.17 LUMBOSACRAL RADICULOPATHY AT S1: Primary | ICD-10-CM

## 2019-03-21 DIAGNOSIS — G57.42 LEFT TIBIAL NEUROPATHY: ICD-10-CM

## 2019-03-21 DIAGNOSIS — M54.42 CHRONIC BILATERAL LOW BACK PAIN WITH BILATERAL SCIATICA: ICD-10-CM

## 2019-03-21 DIAGNOSIS — M54.41 CHRONIC BILATERAL LOW BACK PAIN WITH BILATERAL SCIATICA: ICD-10-CM

## 2019-03-21 DIAGNOSIS — G89.29 CHRONIC BILATERAL LOW BACK PAIN WITH BILATERAL SCIATICA: ICD-10-CM

## 2019-03-21 PROCEDURE — 95886 MUSC TEST DONE W/N TEST COMP: CPT | Performed by: PHYSICAL MEDICINE & REHABILITATION

## 2019-03-21 PROCEDURE — 95910 NRV CNDJ TEST 7-8 STUDIES: CPT | Performed by: PHYSICAL MEDICINE & REHABILITATION

## 2020-07-23 ENCOUNTER — HOSPITAL ENCOUNTER (OUTPATIENT)
Dept: PHYSICAL THERAPY | Age: 70
Setting detail: THERAPIES SERIES
Discharge: HOME | End: 2020-07-23
Payer: MEDICARE

## 2020-07-23 PROCEDURE — 97110 THERAPEUTIC EXERCISES: CPT

## 2020-07-23 PROCEDURE — 97162 PT EVAL MOD COMPLEX 30 MIN: CPT

## 2020-07-23 NOTE — PROGRESS NOTES
[facility]  [address] [place] [id number]  Phone: [phone] Fax: [phone]    PHYSICAL THERAPY INITIAL ASSESSMENT      Date: 2020  Patient Name: Mercedes Dakin   : 1950  Referred by: Maggy Holguin MD  Reason for Referral: infection  PT Impression:   Insurance: [facility] Medicare  Restrictions/Precautions: fall risk    Subjective   Chart Reviewed: Yes   Patient assessed for rehabilitation services?: Yes   Family / Caregiver Present: no  Follows Commands: Within Functional Limits   Date of onset:  \"about a month ago\"  Subjective: Pt reports she was laying in bed and her leg would not straighten out. She reports she had a TKA 2011 and got an infection last year and the proshtesis was removed and a spacer placed for 8 weeks. She reports they put it back in and it immediately was infected again. She reports she was on a high dose of antibiotic and it worked for a year, but now the infection is back. Current Situation: She reports they increased her antibiotic recently and it has been feeling better now. Observation: Pt demonstrated a slightly antalgic gait. Medication: see list in chart. Pain Screening   Patient Currently in Pain:   Pain Assessment: 0-10   Pain Level: 4/10    Worst pain: 8/10   Best pain:   4/10   Sensation: WNL per pt report    Vision/Hearing   Vision: impaired. Pt wears glasses all the time. Hearing: impaired L>R    Home Living  Lives With:   Type of Home: House  Home Layout:  2 story  Toilet: standard, with sink nearby. 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 Pt reports she uses a stool. 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 Prior level of function:  Pt reports she did better on the steps and had more ROM.    Patient goals: pt reports she would like to be able to ambulate up and down steps reciprocally. Orientation: WNL    Objective  AROM  LE  Knee:    Right Left   Knee flexion      110  degrees     100   degrees   Knee extension     5 degree hyper-extension      5 degree hyper-extension     Strength LE  Hip:   Right Left   Hip flexion         4+/5        4-/5   Hip abd        5/5        5/5   Hip add        4/5        4/5     Knee:    Right Left   Knee flexion        5/5        4+/5   Knee extension        5/5        4+/5     Ankle:   Right Left   Ankle DF         5/5        4+/5     Bed Mobility: mod I    Transfers  Sit to Stand: Independent  Stand to sit: Independent    Ambulation  Ambulation?: Yes  WB Status: FWB  Surface: level tile  Device: none  Assistance: Indpendent  Quality of Gait: Pt demonstrated slightly antalgic gait, but was steady. Distance: 39' x2  Stairs?: not performed  Palpation: not performed    Additional Tests: LEFs: 41/80 = 51.25% ability = 48.75% disability     Assessment   Decreased functional mobility ; Decreased strength;Decreased endurance;Decreased high-level IADLs;Decreased ADL status; Decreased ROM; Assessment: Pt is a pleasant 73 yo female who would benefit from skilled PT to address decreased ROM, strength, balance, and functional mobility as well as increased pain. Prognosis: Good  Discharge Recommendations: Patient would benefit from additional therapy;Continue to assess pending progress,   Requires PT Follow Up: Yes  Activity Tolerance: Patient Tolerated treatment well. Treatment Administered: see flowsheet  Patient Education: see flowsheet    Plan   Plan of care initiated  Frequency and duration of tx:  2x/week x8+ weeks  Barriers include: none  Treatment:  1. Therapeutic exercises including ROM, PREs, stretching, strengthening, and stability  2. Therapeutic activity  3. Gait training  4. Stair training  5. Neuro re-ed  6. Coordination training and body awareness  7.  Positioning and postural awareness  8. Modalities including e-stim, ultrasound, heat, cold, and foam roll  9. Manual therapy including: STM, MFR, and TPR  10. HEP and education    Goals  Short term goals to be achieved by August 23, 2020:  Short term goal 1: Pt will report compliance with current HEP as prescribed in order to improve ROM and strength. Short term goal 2: Pt will demonstrate AROM L knee flexion greater than or equal to 110 degrees in order to improve ROM. Short term goal 3: Pt will demonstrate L hip flexion strength greater than or equal to 4+/5 in order to improve strength. Short term goal 4: Pt will demonstrate the ability to safely ambulate up the steps with a reciprocal gait in 2/2 trials with 1 HHA and mod I. Short term goal 5: Pt will report worst pain no more than 5/10 in the last week in order to improve quality of life. Long term goals to be achieved by September 23, 2020:   Long term goal 1: Pt will demonstrate I with current HEP as prescribed in order to improve ROM and strength. Long term goal 2: Pt will demonstrate B LE strength greater than or equal to 4+/5 in order to improve strength. Long term goal 3: Pt will demonstrate the ability to safely and reciprocally ambulate down the steps in 2/2 trials in order to improve stair safety. Long term goal 4: Pt will demonstrate a score of no more than 35% disability on the Oswestry in order to improve quality of life. Note: Goals, frequency, plan, and recommendations will be updated as needed. Goals and treatment plan discussed with family and mutually agreed upon. YES   Will discharge patient when therapy goals have been met or when therapy is no longer deemed necessary. This plan was reviewed with the patient/family and they were in agreement. Electronically signed by:  Zoë Mejia [id number] Date: 7/23/2020, Time: 6:53 AM      I certify that the above patient is under my care and requires the above skilled services.

## 2020-07-23 NOTE — FLOWSHEET NOTE
Outpatient Physical Therapy  [place]           [x] Phone: [phone]   Fax: [phone]  [facility]           [] Phone: [phone]   Fax: [phone]        Physical Therapy Daily Treatment Note  Date:  2020    Patient Name:  Coleen Pitt    :  1950  MRN: [id number]  Restrictions/Precautions:  fall risk  Diagnosis:      Date of Injury/Surgery: unknown  Treatment Diagnosis:      Insurance/Certification information:   [facility] Medicare  Referring Physician:   Dr Edouard Null MD  Plan of care signed (Y/N):  eval faxed  Outcome Measure: LEFs:  = 51.25% ability = 48.75% disability   Visit# / total visits:   1/10 then PN  Pain level: 4/10   Goals:   See eval    Subjective:  See eval     Any changes in Ambulatory Summary Sheet? None    Objective:  See eval   Prior to today's treatment session, patient was screened for signs and symptoms related to COVID-19 including but not limited to verbally answering questions related to feeling ill, cough, or SOB, along with taking temperature via forehead thermometer. Patient presented with all negative signs and symptoms and had no fever >100 degrees Fahrenheit this date. Exercises: (No more than 4 columns)   Exercise/Equipment Date: 2020 Date Date           WARM UP                     TABLE      SAQ 2x10 with 5\" hold 1x with 2# wt and 1x with 4# wt     SLR 2x10 with 4# wt     TKE 2x15      4 way hip 1x12 B LE      HS curls    1x12 L LE     Heel raises 1x10 B LE                 STANDING                                                     PROPRIOCEPTION                                    MODALITIES                    Other Therapeutic Activities/Education:  Pt educated on POC and what to expect. Home Exercise Program:  Pt educated to perform SLR and TKE at home with red t-band.      Manual Treatments:  none    Modalities:  None    Communication with other providers:  eval faxed    Assessment:  (Response towards treatment session) (Pain Rating) 0/10   Pt would benefit from skilled PT to address decreased ROM, strength, balance, and functional mobility.      Plan for Next Session:  Continue with LE strengthening    Time In / Time Out:    9:15-10:15     Timed Code/Total Treatment Minutes:  60/1 mod eval, 3 therapeutic exercise    Next Progress Note due:  8/23/2020    Plan of Care Interventions:  [x] Therapeutic Exercise  [] Modalities:  [x] Therapeutic Activity     [] Ultrasound  [] Estim  [x] Gait Training      [] Cervical Traction [] Lumbar Traction  [x] Neuromuscular Re-education    [] Cold/hotpack [] Iontophoresis   [x] Instruction in HEP      [] Vasopneumatic   [] Dry Needling    [] Manual Therapy               [] Aquatic Therapy              Electronically signed by:  Luis Alfredo Toro PT DPT [id number]  7/23/2020, 9:54 AM

## 2020-07-28 ENCOUNTER — HOSPITAL ENCOUNTER (OUTPATIENT)
Dept: PHYSICAL THERAPY | Age: 70
Setting detail: THERAPIES SERIES
Discharge: HOME | End: 2020-07-28
Payer: MEDICARE

## 2020-07-28 PROCEDURE — 97110 THERAPEUTIC EXERCISES: CPT

## 2020-07-28 NOTE — FLOWSHEET NOTE
Outpatient Physical Therapy  [place]           [x] Phone: [phone]   Fax: [phone]  [facility]           [] Phone: [phone]   Fax: [phone]        Physical Therapy Daily Treatment Note  Date:  2020    Patient Name:  Merced Dugan    :  1950  MRN: [id number]  Restrictions/Precautions:  fall risk  Diagnosis:      Date of Injury/Surgery: unknown  Treatment Diagnosis:      Insurance/Certification information:   [facility] Medicare  Referring Physician:   Dr Shi Dove MD  Plan of care signed (Y/N):  eval faxed  Outcome Measure: LEFs: 4180 = 51.25% ability = 48.75% disability   Visit# / total visits:   2/10 then PN  Pain level: 4/10   Goals:   See eval    Subjective:   Patient reports of 10 pain upon arrival and reports her back is bothering her this morning due to pulling weeds in her garden yesterday. Any changes in Ambulatory Summary Sheet? None    Objective:   Compliance with HEP  Prior to today's treatment session, patient was screened for signs and symptoms related to COVID-19 including but not limited to verbally answering questions related to feeling ill, cough, or SOB, along with taking temperature via forehead thermometer. Patient presented with all negative signs and symptoms and had no fever >100 degrees Fahrenheit this date.    Exercises: (No more than 4 columns)   Exercise/Equipment Date: 2020 Date 2020 Date           WARM UP      Nustep     S8/A9 Lv3 7'          TABLE      SAQ 2x10 with 5\" hold 1x with 2# wt and 1x with 4# wt Med roll 4# 2x10 B    SLR 2x10 with 4# wt 4# 2x10 R 15x L    TKE 2x15  RTB 50x B    4 way hip 1x12 B LE  2x10 B    HS curls    1x12 L LE 2x10 B    Heel raises 1x10 B LE 2x10 B    Bridge  10x    Clamshells  Supine RTB 2x10 2\"    Ball squeeze  2x10 2\"                            STANDING                                                     PROPRIOCEPTION                                    MODALITIES                    Other Therapeutic

## 2020-07-31 ENCOUNTER — HOSPITAL ENCOUNTER (OUTPATIENT)
Dept: PHYSICAL THERAPY | Age: 70
Setting detail: THERAPIES SERIES
Discharge: HOME | End: 2020-07-31
Payer: MEDICARE

## 2020-07-31 PROCEDURE — 97110 THERAPEUTIC EXERCISES: CPT

## 2020-07-31 NOTE — FLOWSHEET NOTE
Outpatient Physical Therapy  [place]           [x] Phone: [phone]   Fax: [phone]  [facility]           [] Phone: [phone]   Fax: [phone]        Physical Therapy Daily Treatment Note  Date:  2020    Patient Name:  Dara Weller    :  1950  MRN: [id number]  Restrictions/Precautions:  fall risk  Diagnosis:      Date of Injury/Surgery: unknown  Treatment Diagnosis:      Insurance/Certification information:   [facility] Medicare  Referring Physician:   Dr Dennis Barrett MD  Plan of care signed (Y/N):  paolaal faxed  Outcome Measure: LEFs: 4180 = 51.25% ability = 48.75% disability   Visit# / total visits:   0/10 then PN  Pain level: 0/10   Goals:   See eval    Subjective:   Patient reports of 0/10 pain upon arrival and reports she was able to stand and do some suly with minimal pain in her back. Any changes in Ambulatory Summary Sheet? None    Objective:   Standing longer with slight decrease in pain       Prior to today's treatment session, patient was screened for signs and symptoms related to COVID-19 including but not limited to verbally answering questions related to feeling ill, cough, or SOB, along with taking temperature via forehead thermometer. Patient presented with all negative signs and symptoms and had no fever >100 degrees Fahrenheit this date.    Exercises: (No more than 4 columns)   Exercise/Equipment Date: 2020 Date 2020 Date 2020           WARM UP      Nustep     S8/A9 Lv3 7' S8/A9 Lv3 7'         TABLE      SAQ 2x10 with 5\" hold 1x with 2# wt and 1x with 4# wt Med roll 4# 2x10 B Med roll 4# 2x10 B   SLR 2x10 with 4# wt 4# 2x10 R 15x L 4# 2x10 B   TKE 2x15  RTB 50x B RTB 2x15 B   4 way hip 1x12 B LE  2x10 B 2x10 B   HS curls    1x12 L LE 2x10 B 2x10 B   Heel raises 1x10 B LE 2x10 B 2x10 B   Bridge  10x 10x   Clamshells  Supine RTB 2x10 2\" Supine RTB 2x10 2\"   Ball squeeze  2x10 2\" 2x10 2\"                           STANDING PROPRIOCEPTION                                    MODALITIES                    Other Therapeutic Activities/Education:  Pt educated on POC and what to expect. Home Exercise Program:  Pt educated to perform SLR and TKE at home with red t-band. Manual Treatments:  none    Modalities:  None    Communication with other providers:  eval faxed    Assessment:  (Response towards treatment session) (Pain Rating) 3/10 at end of treatment in the anterior L knee and did well with all exs today  Pt would benefit from skilled PT to address decreased ROM, strength, balance, and functional mobility.      Plan for Next Session:  Continue with LE strengthening    Time In / Time Out:    1038/1128     Timed Code/Total Treatment Minutes:  50te      Next Progress Note due:  8/23/2020    Plan of Care Interventions:  [x] Therapeutic Exercise  [] Modalities:  [x] Therapeutic Activity     [] Ultrasound  [] Estim  [x] Gait Training      [] Cervical Traction [] Lumbar Traction  [x] Neuromuscular Re-education    [] Cold/hotpack [] Iontophoresis   [x] Instruction in HEP      [] Vasopneumatic   [] Dry Needling    [] Manual Therapy               [] Aquatic Therapy              Electronically signed by:  Barbara Skinner PTA  7/31/2020, 10:38 AM

## 2020-08-04 ENCOUNTER — HOSPITAL ENCOUNTER (OUTPATIENT)
Dept: PHYSICAL THERAPY | Age: 70
Setting detail: THERAPIES SERIES
Discharge: HOME | End: 2020-08-04
Payer: MEDICARE

## 2020-08-04 PROCEDURE — 97112 NEUROMUSCULAR REEDUCATION: CPT

## 2020-08-04 PROCEDURE — 97110 THERAPEUTIC EXERCISES: CPT

## 2020-08-04 NOTE — FLOWSHEET NOTE
Outpatient Physical Therapy  [place]           [x] Phone: [phone]   Fax: [phone]  [place]           [] Phone: [phone]   Fax: [phone]        Physical Therapy Daily Treatment Note  Date:  2020    Patient Name:  Arsenio Rashid    :  1950  MRN: [id number]  Restrictions/Precautions:  fall risk  Diagnosis:      Date of Injury/Surgery: unknown  Treatment Diagnosis:      Insurance/Certification information:   [facility] Medicare  Referring Physician:   Dr Kaushik Gomes MD  Plan of care signed (Y/N):  eval faxed  Outcome Measure: LEFs: 4180 = 51.25% ability = 48.75% disability   Visit# / total visits:  7/10 then PN  Pain level: 0/10   Goals:   See eval    Subjective:   Patient reports of 0/10 pain upon arrival and reports she was able to stand and do some suly yesterday but when she went to bed is when she noticed it and rated it at a 3-4/10. Any changes in Ambulatory Summary Sheet? None    Objective:  Pain at worst in the last 7 days 4-5/10 and at best 0/10. Prior to today's treatment session, patient was screened for signs and symptoms related to COVID-19 including but not limited to verbally answering questions related to feeling ill, cough, or SOB, along with taking temperature via forehead thermometer. Patient presented with all negative signs and symptoms and had no fever >100 degrees Fahrenheit this date.    Exercises: (No more than 4 columns)   Exercise/Equipment Date 2020 Date 2020           WARM UP      Nustep    S8/A9 Lv3 7' S8/A9 Lv3 7' S8/A9 Lv4 8'         TABLE      SAQ Med roll 4# 2x10 B Med roll 4# 2x10 B Med roll 4# 2x10 B   SLR 4# 2x10 R 15x L 4# 2x10 B 4# 2x10 B   TKE RTB 50x B RTB 2x15 B RTB 2x15   4 way hip 2x10 B 2x10 B 2x10 B   HS curls    2x10 B 2x10 B 2x10 B   Heel raises 2x10 B 2x10 B 20x B   Bridge 10x 10x 15x   Clamshells Supine RTB 2x10 2\" Supine RTB 2x10 2\" Supine gTB 2x10 2\"   Ball squeeze 2x10 2\" 2x10 2\" 2x10 2\"   LAQ 2x10 5\" LLE                     STANDING      Rocker board  Fwd/Lat   2x30\" ea way   Step ups   4\" 1x15 B                                      PROPRIOCEPTION                                    MODALITIES                    Other Therapeutic Activities/Education:  Pt educated on POC and what to expect. Home Exercise Program:  Pt educated to perform SLR and TKE at home with red t-band. Manual Treatments:  none    Modalities:  None    Communication with other providers:  eval faxed    Assessment:  (Response towards treatment session) (Pain Rating) denies pain at end of treatment in the anterior L knee and did well with all exs today  Pt would benefit from skilled PT to address decreased ROM, strength, balance, and functional mobility.      Plan for Next Session:  Continue with LE strengthening    Time In / Time Out:    0343/9802    Timed Code/Total Treatment Minutes:   56 15nr 41te    Next Progress Note due:  8/23/2020    Plan of Care Interventions:  [x] Therapeutic Exercise  [] Modalities:  [x] Therapeutic Activity     [] Ultrasound  [] Estim  [x] Gait Training      [] Cervical Traction [] Lumbar Traction  [x] Neuromuscular Re-education    [] Cold/hotpack [] Iontophoresis   [x] Instruction in HEP      [] Vasopneumatic   [] Dry Needling    [] Manual Therapy               [] Aquatic Therapy              Electronically signed by:  Karthik Nascimento PTA  8/4/2020, 9:00 AM

## 2020-08-07 ENCOUNTER — HOSPITAL ENCOUNTER (OUTPATIENT)
Dept: PHYSICAL THERAPY | Age: 70
Setting detail: THERAPIES SERIES
Discharge: HOME | End: 2020-08-07
Payer: MEDICARE

## 2020-08-07 PROCEDURE — 97530 THERAPEUTIC ACTIVITIES: CPT

## 2020-08-07 PROCEDURE — 97110 THERAPEUTIC EXERCISES: CPT

## 2020-08-07 NOTE — FLOWSHEET NOTE
Outpatient Physical Therapy  [place]           [x] Phone: [phone]   Fax: [phone]  [place]           [] Phone: [phone]   Fax: [phone]        Physical Therapy Daily Treatment Note  Date:  2020    Patient Name:  Austin Jiménez    :  1950  MRN: [id number]  Restrictions/Precautions:  fall risk  Diagnosis:      Date of Injury/Surgery: unknown  Treatment Diagnosis:      Insurance/Certification information:   [facility] Medicare  Referring Physician:   Dr Angel De Luna MD  Plan of care signed (Y/N):  eval faxed  Outcome Measure: LEFs: 4180 = 51.25% ability = 48.75% disability   Visit# / total visits: 8/10 then PN  Pain level: 0/10   Goals:   See eval    Subjective:   Patient reports of 0/10 pain upon arrival and reports it still has the bump and is still swollen, but otherwise no pain. Any changes in Ambulatory Summary Sheet? None    Objective:   Reciprocal gait up/down stairs with single hand hold Indep        Prior to today's treatment session, patient was screened for signs and symptoms related to COVID-19 including but not limited to verbally answering questions related to feeling ill, cough, or SOB, along with taking temperature via forehead thermometer. Patient presented with all negative signs and symptoms and had no fever >100 degrees Fahrenheit this date.    Exercises: (No more than 4 columns)   Exercise/Equipment Date 2020 Date 2020            WARM UP       Nustep    S8/A9 Lv3 7' S8/A9 Lv3 7' S8/A9 Lv4 8' S8/A9 Lv4 8'          TABLE       SAQ Med roll 4# 2x10 B Med roll 4# 2x10 B Med roll 4# 2x10 B Med roll 4# 2x10 B   SLR 4# 2x10 R 15x L 4# 2x10 B 4# 2x10 B 4# 2x10 B   TKE RTB 50x B RTB 2x15 B RTB 2x15    4 way hip 2x10 B 2x10 B 2x10 B 2x10 B   HS curls    2x10 B 2x10 B 2x10 B 20x B   Heel raises 2x10 B 2x10 B 20x B 20x B   Bridge 10x 10x 15x 20x   Clamshells Supine RTB 2x10 2\" Supine RTB 2x10 2\" Supine gTB 2x10 2\" Supine GTB 2x10 2\"   [employer] squeeze 2x10 2\" 2x10 2\" 2x10 2\" 2x10 2\"   LAQ   2x10 5\" LLE 2x10 5\" LLE                        STANDING       Rocker board  Fwd/Lat   2x30\" ea way 2x30\" ea way   Step ups   4\" 1x15 B 4\" 1x15 B                                           PROPRIOCEPTION                                          MODALITIES                       Other Therapeutic Activities/Education:  Pt educated on POC and what to expect. Home Exercise Program:  Pt educated to perform SLR and TKE at home with red t-band. Manual Treatments:  none    Modalities:  None    Communication with other providers:  eval faxed    Assessment:  (Response towards treatment session) (Pain Rating) denies pain at end of treatment in the anterior L knee and did well with all exs today  Pt would benefit from skilled PT to address decreased ROM, strength, balance, and functional mobility.      Plan for Next Session:  Continue with LE strengthening    Time In / Time Out:    0930/1030    Timed Code/Total Treatment Minutes:  60   15ta 45te    Next Progress Note due:  8/23/2020    Plan of Care Interventions:  [x] Therapeutic Exercise  [] Modalities:  [x] Therapeutic Activity     [] Ultrasound  [] Estim  [x] Gait Training      [] Cervical Traction [] Lumbar Traction  [x] Neuromuscular Re-education    [] Cold/hotpack [] Iontophoresis   [x] Instruction in HEP      [] Vasopneumatic   [] Dry Needling    [] Manual Therapy               [] Aquatic Therapy              Electronically signed by:  Margot Bonilla PTA  8/7/2020, 9:30 AM

## 2020-08-10 ENCOUNTER — HOSPITAL ENCOUNTER (OUTPATIENT)
Dept: PHYSICAL THERAPY | Age: 70
Setting detail: THERAPIES SERIES
Discharge: HOME | End: 2020-08-10
Payer: MEDICARE

## 2020-08-10 PROCEDURE — 97110 THERAPEUTIC EXERCISES: CPT

## 2020-08-10 NOTE — FLOWSHEET NOTE
Outpatient Physical Therapy  [place]           [x] Phone: [phone]   Fax: [phone]  [facility]           [] Phone: [phone]   Fax: [phone]        Physical Therapy Daily Treatment Note  Date:  8/10/2020    Patient Name:  Silvia Cerda    :  1950  MRN: [id number]  Restrictions/Precautions:  fall risk  Diagnosis:      Date of Injury/Surgery: unknown  Treatment Diagnosis:      Insurance/Certification information:   [facility] Medicare  Referring Physician:   Dr Brett Wright MD  Plan of care signed (Y/N):  paolaal faxed  Outcome Measure: LEFs: 4180 = 51.25% ability = 48.75% disability   Visit# / total visits: 9/10 then PN  Pain level: 0/10   Goals:   Short term goals to be achieved by 2020:  Short term goal 1: Pt will report compliance with current HEP as prescribed in order to improve ROM and strength. - not met, continue. Short term goal 2: Pt will demonstrate AROM L knee flexion greater than or equal to 110 degrees in order to improve ROM. - MET, discharge goal.  Short term goal 3: Pt will demonstrate L hip flexion strength greater than or equal to 4+/5 in order to improve strength. - not met, continue. Short term goal 4: Pt will demonstrate the ability to safely ambulate up the steps with a reciprocal gait in 2/2 trials with 1 HHA and mod I. - MET, discharge goal.   Short term goal 5: Pt will report worst pain no more than 5/10 in the last week in order to improve quality of life. - MET, discharge goal.     Subjective:   Patient reports of 0/10 pain upon arrival. She reports she has been painting dugouts at softball fields and climbing up and down ladders. Pt reports she has not had any paint in the last week. Any changes in Ambulatory Summary Sheet?   None    Objective:  L hip flexion: 3/5   L knee flexion: 122 degrees  Reciprocal gait up/down stairs with single hand hold Indep     Prior to today's treatment session, patient was screened for signs and symptoms related to COVID-19 including but not limited to verbally answering questions related to feeling ill, cough, or SOB, along with taking temperature via forehead thermometer. Patient presented with all negative signs and symptoms and had no fever >100 degrees Fahrenheit this date. Exercises: (No more than 4 columns)   Exercise/Equipment 8/4/2020 8/7/2020 Date: 8/10/2020           WARM UP      Nustep    S8/A9 Lv4 8' S8/A9 Lv4 8' Seat 9, UE 10, level 5 x10'         TABLE      SAQ Med roll 4# 2x10 B Med roll 4# 2x10 B Med roll 4# 2x15 L LE with 5\" hold   SLR 4# 2x10 B 4# 2x10 B 4# 2x10 L LE   TKE RTB 2x15     4 way hip 2x10 B 2x10 B 2x10 red t-band   HS curls    2x10 B 20x B    Heel raises 20x B 20x B    Bridge 15x 20x    Clamshells Supine gTB 2x10 2\" Supine GTB 2x10 2\"    Ball squeeze 2x10 2\" 2x10 2\"    LAQ 2x10 5\" LLE 2x10 5\" LLE    Seated marching   2x20 4# wt L LE               STANDING      Rocker board  Fwd/Lat 2x30\" ea way 2x30\" ea way 1x1' each way   Step ups 4\" 1x15 B 4\" 1x15 B 6\" step 1x7 each LE up and down                                       PROPRIOCEPTION                                    MODALITIES                    Other Therapeutic Activities/Education:  Pt educated that therapy will focus on strengthening L LE especially hip and on eccentric control. Home Exercise Program:  Pt educated to continue HEP and to work on stepping down. Manual Treatments:  none    Modalities:  None    Communication with other providers:  none    Assessment:  (Response towards treatment session) (Pain Rating) denies pain at end of treatment in the anterior L knee and did well with all exs today  Pt would benefit from skilled PT to address decreased ROM, strength, balance, and functional mobility.      Plan for Next Session:  Continue with LE strengthening    Time In / Time Out:  9:50-10:30 am    Timed Code/Total Treatment Minutes: 45 minutes/ 3 therapeutic exercise    Next Progress Note due:  8/23/2020    Plan of Care Interventions:  [x] Therapeutic Exercise  [] Modalities:  [x] Therapeutic Activity     [] Ultrasound  [] Estim  [x] Gait Training      [] Cervical Traction [] Lumbar Traction  [x] Neuromuscular Re-education    [] Cold/hotpack [] Iontophoresis   [x] Instruction in HEP      [] Vasopneumatic   [] Dry Needling    [] Manual Therapy               [] Aquatic Therapy              Electronically signed by:  Julio Joseph, PT, DPT [id number]   8/10/2020, 9:59 AM

## 2020-08-10 NOTE — PROGRESS NOTES
Outpatient Physical Therapy        [x] Phone: [phone] Fax: [phone]   Physician: Dr Teresa Campos MD   From: Donna Nails, PT  DPT   Patient: Shante Guevara                  : 1950  Diagnosis:        Date: 8/10/2020  Treatment Diagnosis:      [x]  Progress Note                []  Discharge Note    Total Visits to date:  9  Cancels/No-shows to date:  0    Subjective: Pt reports she is doing better and has no pain, but she continues to have difficulty descending steps and she continues to have some weakness in her L LE. Plan of Care/Treatment to date:  [x] Therapeutic Exercise    [] Modalities:  [x] Therapeutic Activity     [] Ultrasound  [] Electric Stimulation  [x] Gait Training      [] Cervical Traction    [] Lumbar Traction  [x] Neuromuscular Re-education  [] Cold/hotpack [] Iontophoresis  [x] Instruction in HEP      Other:  [] Manual Therapy       []  Vasopneumatic  [] Aquatic Therapy       []                    ? Objective/Significant Findings At Last Visit/Comments:     hip flexion strength: 3/5   L knee flexion: 122 degrees    Assessment: Pt is a pleasant 71 yo female who would benefit from skilled PT to address decreased strength, balance, and functional mobility. Goal Status:  [] Achieved [x] Partially Achieved  [] Not Achieved   Short term goal 1: Pt will report compliance with current HEP as prescribed in order to improve ROM and strength. - not met, continue. Short term goal 2: Pt will demonstrate AROM L knee flexion greater than or equal to 110 degrees in order to improve ROM.  - MET, discharge goal.  Short term goal 3: Pt will demonstrate L hip flexion strength greater than or equal to 4+/5 in order to improve strength. - not met, continue.    Short term goal 4: Pt will demonstrate the ability to safely ambulate up the steps with a reciprocal gait in 2/2 trials with 1 HHA and mod I. - MET, discharge goal.   Short term goal 5: Pt will report worst pain no more than 5/10 in the last week in order to improve quality of life.  - MET, discharge goal.     Updated Goals to be achieved by September 10, 2020: (or 10 visits)  1) Continue Above  2) Continue 3 Above  3) Pt will demonstrate the ability to safely and reciprocally ambulate down the steps in 2/2 trials with no UE A in order to improve stair safety. 4) Pt will demonstrate a score of no more than 35% disability on the Oswestry in order to improve quality of life. Frequency/Duration:  # Days per week: [] 1 day # Weeks: [] 1 week [x] 4 weeks      [x] 2 days? [] 2 weeks [] 5 weeks      [] 3 days   [] 3 weeks [] 6 weeks     Rehab Potential: [x] Excellent [] Good [] Fair  [] Poor     Patient Status: [] Continue per initial plan of Care     [] Patient now discharged     [x] Additional visits requested, Please re-certify for additional visits:      Requested frequency/duration:  2/week for 4 weeks    If we are requesting more visits, we fully anticipate the patient's condition is expected to improve within the treatment timeframe we are requesting. Electronically signed by:  Mónica Payan, PT,DPT [id number]  8/10/2020, 2:10 PM    If you have any questions or concerns, please don't hesitate to call.   Thank you for your referral.    Physician Signature:______________________ Date:______ Time: ________  By signing above, therapists plan is approved by physician

## 2020-08-12 ENCOUNTER — HOSPITAL ENCOUNTER (OUTPATIENT)
Dept: PHYSICAL THERAPY | Age: 70
Setting detail: THERAPIES SERIES
Discharge: HOME | End: 2020-08-12
Payer: MEDICARE

## 2020-08-12 PROCEDURE — 97110 THERAPEUTIC EXERCISES: CPT

## 2020-08-12 NOTE — FLOWSHEET NOTE
Outpatient Physical Therapy  [place]           [x] Phone: [phone]   Fax: [phone]  [place]           [] Phone: [phone]   Fax: [phone]        Physical Therapy Daily Treatment Note  Date:  2020    Patient Name:  Merced Dugan    :  1950  MRN: [id number]  Restrictions/Precautions:  fall risk  Diagnosis:      Date of Injury/Surgery: unknown  Treatment Diagnosis:      Insurance/Certification information:   [facility]  Referring Physician:   Dr Shi Dove MD  Plan of care signed (Y/N):  gloria faxed  Outcome Measure: LEFs: 41/80 = 51.25% ability = 48.75% disability   Visit# / total visits: 10/10 then   Pain level: 0/10   Goals:   Short term goals to be achieved by 2020:  Short term goal 1: Pt will report compliance with current HEP as prescribed in order to improve ROM and strength. - not met, continue. Short term goal 2: Pt will demonstrate AROM L knee flexion greater than or equal to 110 degrees in order to improve ROM. - MET, discharge goal.  Short term goal 3: Pt will demonstrate L hip flexion strength greater than or equal to 4+/5 in order to improve strength. - not met, continue. Short term goal 4: Pt will demonstrate the ability to safely ambulate up the steps with a reciprocal gait in 2/2 trials with 1 HHA and mod I. - MET, discharge goal.   Short term goal 5: Pt will report worst pain no more than 5/10 in the last week in order to improve quality of life. - MET, discharge goal.     Subjective:   Patient reports of 0/10 pain upon arrival. She reports she has been painting dugouts at softball fields and climbing up and down ladders. Pt reports she has not had any paint in the last week. Any changes in Ambulatory Summary Sheet?   None    Objective:   LEFS 42/80 RAW score    Prior to today's treatment session, patient was screened for signs and symptoms related to COVID-19 including but not limited to verbally answering questions related to feeling

## 2020-11-12 ENCOUNTER — HOSPITAL ENCOUNTER (OUTPATIENT)
Dept: LAB | Age: 70
Discharge: HOME | End: 2020-11-12
Payer: MEDICARE

## 2020-11-12 PROCEDURE — U0002 COVID-19 LAB TEST NON-CDC: HCPCS

## 2020-11-12 PROCEDURE — C9803 HOPD COVID-19 SPEC COLLECT: HCPCS

## 2020-11-14 LAB
SARS-COV-2: NOT DETECTED
SOURCE: NORMAL

## 2020-11-17 ENCOUNTER — HOSPITAL ENCOUNTER (OUTPATIENT)
Dept: NON INVASIVE DIAGNOSTICS | Age: 70
Discharge: HOME | End: 2020-11-17
Payer: MEDICARE

## 2020-11-17 VITALS
SYSTOLIC BLOOD PRESSURE: 152 MMHG | HEART RATE: 93 BPM | RESPIRATION RATE: 15 BRPM | OXYGEN SATURATION: 98 % | DIASTOLIC BLOOD PRESSURE: 96 MMHG

## 2020-11-17 LAB
LV EF: 53 %
LVEF MODALITY: NORMAL

## 2020-11-17 PROCEDURE — 7100000000 HC PACU RECOVERY - FIRST 15 MIN

## 2020-11-17 PROCEDURE — 7100000001 HC PACU RECOVERY - ADDTL 15 MIN

## 2020-11-17 PROCEDURE — 93312 ECHO TRANSESOPHAGEAL: CPT

## 2020-11-17 NOTE — H&P
[address] [place] [address], [address] [place] [address]                              HISTORY AND PHYSICAL    PATIENT NAME: Mila Ramírez                      :        1950  MED REC NO:   [id number]                          ROOM:  ACCOUNT NO:   [de-identified]                           ADMIT DATE: 2020  PROVIDER:     Katarina Hernandez MD    PRIMARY CARE PROVIDER:  Leroy Gileltte MD    PODIATRIST:  Brandon Luevano DPM    HISTORY OF PRESENT ILLNESS:  This is a 72-year-old female patient who  has been in the office recently and looks like the patient had remote  heart catheterization done back in  and her heart cath has  nonobstructive coronary artery disease present back in . She had  echo done in office on 10/2020 and PFO was noted. There appears to be a  right ventricular enlargement present, right atrial enlargement present. PFO was noted with left to right shunt present with Qp-Qs 2.4 and  therefore she is here for CEDRIC to evaluate the PFO. The patient had Holter done back in 2018, chronic atrial fibrillation  present and she had stress test in 10/2020, stress test was negative  ischemia, LV function was preserved. Chronic AFib was noted. PAST MEDICAL HISTORY:  Questionable peripheral vascular disease present. She had arterial duplex done and I think the duplex ultrasound with no  significant disease was noted. ABIs abnormal, but I think it is falsely  positive. Atrial fibrillation chronic, on anticoagulation, PFO was noted,  hypertension, hyperlipidemia, cellulitis of the legs present and chronic  lower extremity ulcers present, COPD, sleep apnea present and uses CPAP. She sees Dr. Angel Hernandez. PAST SURGICAL HISTORY:  Bariatric surgery, bilateral knee replacement  and left knee debridement. SOCIAL HISTORY:  Does not smoke, does not drink. ALLERGIES:  BACTRIM, SULFA and _____.     MEDICATIONS:  She is on allopurinol, Cartia, doxycycline, Eliquis, iron  sulfate, Neurontin, metoprolol, pravastatin and inhalers. PHYSICAL EXAMINATION:  GENERAL:  The patient is awake, alert and answering questions, not in  acute distress. VITAL SIGNS:  Temperature is afebrile, pulse is 80, blood pressure  163/90. HEENT:  Head is normocephalic and atraumatic. Pupils are equal and  reactive. CHEST:  Equal expansion. LUNGS:  Clear to auscultation. No wheezing or rhonchi. HEART:  Irregularly irregular. ABDOMEN:  Soft and nontender. Bowel sounds are present. No  hepatosplenomegaly or guarding appreciated. EXTREMITIES:  No cyanosis or clubbing noted. She has +1 to 2 edema  present. NEUROLOGIC:  Cranial nerves II through XII are grossly intact. LABORATORY DATA:  Her creatinine is 1.04. LFTs are normal.  Her CBC was  also normal.    IMPRESSION:  This is a 59-year-old female patient, on the echo found to  have PFO noted with left to right shunt present with a Qp-Qs shunt  present 2.4, so the plan at this time is for CEDRIC. I think if she has  significant PFO present, I think we will consider for closure, but she  will need a right and left heart cath prior to that also. Her ASA is 3 and Mallampati is 3.         Catherine Calvin MD    D: 11/17/2020 8:58:18       T: 11/17/2020 10:49:52     [email or web address]  Job#: [id number]     Doc#: [id number]    CC:

## 2020-11-17 NOTE — PROCEDURES
[address] [id number] [facility], [address] [facility]                                 ECHOCARDIOGRAM    PATIENT NAME: Manuel Muse                      :        1950  MED REC NO:   [id number]                          ROOM:  ACCOUNT NO:   [de-identified]                           ADMIT DATE: 2020  PROVIDER:     Nazario Lin MD    DATE OF STUDY:  2020    PROCEDURE:  CEDRIC.    INDICATION:  ASD and PFO. DESCRIPTION OF PROCEDURE:  This is a 59-year-old female patient brought  to the noninvasive lab today. The patient received 4 mg of Versed and  25 mcg of fentanyl in incremental doses. The posterior pharynx was  anesthetized using lidocaine viscous gel. A mouth guard was placed. CEDRIC probe was advanced to the posterior pharynx and mid esophagus. Images were obtained. FINDINGS:  Left atrium was severely enlarged, but spontaneous  echodensity noted in the left atrium, but no clot or thrombus noted in  the left atrium, left atrial appendage, or LV cavity. There is  moderate-to-severe mitral regurgitation noted. There are multiple jets  present. There is a central jet present. There is a prolapse of both  the anterior and posterior mitral valve leaflets present. One central  larger than two of the small jets present. Moderate-to-severe mitral  regurgitation present. LV function is preserved, greater than 55%  present. No pericardial effusion noted. Right atrium is enlarged also  severely. There appears to be a separate atrial chamber present on the  right side. There is a septum noted in the right atrium. ______ noted. There is a small left to right PFO is present with a color Doppler. Bubble study was negative. Moderate tricuspid regurgitation noted. Right ventricle is normal.  Right ventricular function is preserved. Pulmonic valve is normal.  Aortic valve is normal.  It is trileaflet.    Trace aortic regurgitation noted. Ascending aorta is normal.   Descending aorta is normal.    IMPRESSION:  1. Severe left enlargement present. 2.  No clot or thrombus noted in the left atrium, left atrial appendage;  however, spontaneous echodensity present. 3.  Moderate-to-severe mitral regurgitation noted. There is prolapse of  both the anterior and posterior mitral valve leaflets present. 4.  LV function is preserved, greater than 55% present. 5.  Trace or small PFO is noted. Left to right shunt present. Bubble  study was negative. Color Doppler was positive for a small PFO present. 6.  Right atrium is also moderate-to-severely enlarged. There appears  to be a separate septum noted in the right atrium. There is flow  present on both sides. Moderate tricuspid regurgitation noted. The patient tolerated the procedure well. No complications noted. CEDRIC  probe was removed. 1.  At this time, I do not obviously see a large PFO or ASD present. 2.  I think the patient has a separate right atrium or division of the  right atrium present, possibly flow is across to that. The patient remains in atrial fibrillation. The patient tolerated the  procedure well. No complications noted.         Howie Drummond MD    D: 11/17/2020 9:13:52       T: 11/17/2020 10:16:14     [email or web address]  Job#: [id number]     Doc#: [id number]    CC:

## 2021-02-13 NOTE — PROGRESS NOTES
Outpatient Physical Therapy        [x] Phone: [phone] Fax: [phone]   Physician: Dr Mia Youssef MD   From: Annabelle Singh, PT  Patient: Jaleesa Hamilton                  : 1950  Diagnosis:        Date: 2021  Treatment Diagnosis:      []  Progress Note                [x]  Discharge Note    Total Visits to date:  8 planned for 10 Cancels/No-shows to date:     Subjective: 20- last PT session:  Patient reports of 0/10 pain upon arrival. She reports she has been painting dugouts at softball fields and climbing up and down ladders. Pt reports she has not had any paint in the last week.         Plan of Care/Treatment to date:  [x] Therapeutic Exercise    [] Modalities:  [x] Therapeutic Activity     [] Ultrasound  [] Electric Stimulation  [x] Gait Training      [] Cervical Traction    [] Lumbar Traction  [x] Neuromuscular Re-education  [] Cold/hotpack [] Iontophoresis  [x] Instruction in HEP      Other:  [] Manual Therapy       []  Vasopneumatic  [] Aquatic Therapy       []                    ? Objective/Significant Findings At Last Visit/Comments: LEFS 42/80 RAW score    hip flexion strength: 3/5   L knee flexion: 122 degrees;    Assessment: Pt is a pleasant 71 yo female who would benefit from skilled PT to address decreased strength, balance, and functional mobility. Goal Status:  [] Achieved [x] Partially Achieved  [] Not Achieved   Short term goal 1: Pt will report compliance with current HEP as prescribed in order to improve ROM and strength. - not met, continue. Short term goal 2: Pt will demonstrate AROM L knee flexion greater than or equal to 110 degrees in order to improve ROM.  - MET, discharge goal.  Short term goal 3: Pt will demonstrate L hip flexion strength greater than or equal to 4+/5 in order to improve strength. - not met, continue.    Short term goal 4: Pt will demonstrate the ability to safely ambulate up the steps with a reciprocal gait in 2/2 trials with 1 HHA and mod I. - MET, discharge goal.   Short term goal 5: Pt will report worst pain no more than 5/10 in the last week in order to improve quality of life.  - MET, discharge goal.     Updated Goals to be achieved by September 10, 2020: (or 10 visits)  1) Continue Above  2) Continue 3 Above  3) Pt will demonstrate the ability to safely and reciprocally ambulate down the steps in 2/2 trials with no UE A in order to improve stair safety.-not addressed  4) Pt will demonstrate a score of no more than 35% disability on the Oswestry in order to improve quality of life.-not met       [x] Patient now discharged- has not scheduled further OP PT      Electronically signed by:  Amber Montague, PT  2/13/2021, 11:45 AM    If you have any questions or concerns, please don't hesitate to call.   Thank you for your referral.

## 2021-05-12 NOTE — DISCHARGE SUMMARY
Outpatient Physical Therapy        [x] Phone: [phone] Fax: [phone]   Physician: Dr Nena Matias MD   From: Radhika Jolley, PT  DPT   Patient: Merced Dugan                  : 1950  Diagnosis:        Date: 2021  Treatment Diagnosis:      []  Progress Note                [x]  Discharge Note    Total Visits to date:  10 planned for up to 18  Cancels/No-shows to date:  0    Subjective:20- last PT session Patient reports of 0/10 pain upon arrival. She reports she has been painting dugouts at softball fields and climbing up and down ladders. Pt reports she has not had any paint in the last week. Plan of Care/Treatment to date:  [x] Therapeutic Exercise    [] Modalities:  [x] Therapeutic Activity     [] Ultrasound  [] Electric Stimulation  [x] Gait Training      [] Cervical Traction    [] Lumbar Traction  [x] Neuromuscular Re-education  [] Cold/hotpack [] Iontophoresis  [x] Instruction in HEP      Other:  [] Manual Therapy       []  Vasopneumatic  [] Aquatic Therapy       []                    ? Objective/Significant Findings At Last Visit/Comments:     hip flexion strength: 3/5   L knee flexion: 122 degrees  LEFS 42/80 RAW score    Assessment: Pt is a pleasant 71 yo female who would benefit from skilled PT to address decreased strength, balance, and functional mobility. Goal Status:  [] Achieved [x] Partially Achieved  [] Not Achieved   Short term goal 1: Pt will report compliance with current HEP as prescribed in order to improve ROM and strength. - not met, continue. Short term goal 2: Pt will demonstrate AROM L knee flexion greater than or equal to 110 degrees in order to improve ROM.  - MET, discharge goal.  Short term goal 3: Pt will demonstrate L hip flexion strength greater than or equal to 4+/5 in order to improve strength. - not met, continue.    Short term goal 4: Pt will demonstrate the ability to safely ambulate up the steps with a reciprocal gait in 2/2 trials with 1

## 2022-06-30 ENCOUNTER — HOSPITAL ENCOUNTER (OUTPATIENT)
Age: 72
Setting detail: SPECIMEN
Discharge: HOME | End: 2022-06-30
Payer: MEDICARE

## 2022-06-30 LAB
BASOPHILS ABSOLUTE: 0.1 K/CU MM
BASOPHILS RELATIVE PERCENT: 0.6 % (ref 0–1)
C-REACTIVE PROTEIN, HIGH SENSITIVITY: 18.1 MG/L
CREAT SERPL-MCNC: 1.1 MG/DL (ref 0.6–1.1)
DIFFERENTIAL TYPE: ABNORMAL
EOSINOPHILS ABSOLUTE: 0.3 K/CU MM
EOSINOPHILS RELATIVE PERCENT: 3.5 % (ref 0–3)
GFR AFRICAN AMERICAN: 59 ML/MIN/1.73M2
GFR NON-AFRICAN AMERICAN: 49 ML/MIN/1.73M2
HCT VFR BLD CALC: 39.9 % (ref 37–47)
HEMOGLOBIN: 11.9 GM/DL (ref 12.5–16)
IMMATURE NEUTROPHIL %: 1.2 % (ref 0–0.43)
LYMPHOCYTES ABSOLUTE: 1 K/CU MM
LYMPHOCYTES RELATIVE PERCENT: 11 % (ref 24–44)
MCH RBC QN AUTO: 30.9 PG (ref 27–31)
MCHC RBC AUTO-ENTMCNC: 29.8 % (ref 32–36)
MCV RBC AUTO: 103.6 FL (ref 78–100)
MONOCYTES ABSOLUTE: 0.6 K/CU MM
MONOCYTES RELATIVE PERCENT: 6.5 % (ref 0–4)
NUCLEATED RBC %: 0 %
PDW BLD-RTO: 16.6 % (ref 11.7–14.9)
PLATELET # BLD: 350 K/CU MM (ref 140–440)
PMV BLD AUTO: 10.3 FL (ref 7.5–11.1)
RBC # BLD: 3.85 M/CU MM (ref 4.2–5.4)
SEGMENTED NEUTROPHILS ABSOLUTE COUNT: 6.9 K/CU MM
SEGMENTED NEUTROPHILS RELATIVE PERCENT: 77.2 % (ref 36–66)
TOTAL IMMATURE NEUTOROPHIL: 0.11 K/CU MM
TOTAL NUCLEATED RBC: 0 K/CU MM
WBC # BLD: 8.9 K/CU MM (ref 4–10.5)

## 2022-06-30 PROCEDURE — 86140 C-REACTIVE PROTEIN: CPT

## 2022-06-30 PROCEDURE — 85025 COMPLETE CBC W/AUTO DIFF WBC: CPT

## 2022-06-30 PROCEDURE — 82565 ASSAY OF CREATININE: CPT

## 2024-06-11 ENCOUNTER — HOSPITAL ENCOUNTER (OUTPATIENT)
Age: 74
Discharge: HOME | End: 2024-06-11
Attending: INTERNAL MEDICINE
Payer: MEDICARE

## 2024-06-11 ENCOUNTER — HOSPITAL ENCOUNTER (OUTPATIENT)
Dept: ULTRASOUND IMAGING | Age: 74
Discharge: HOME | End: 2024-06-11
Attending: INTERNAL MEDICINE
Payer: MEDICARE

## 2024-06-11 DIAGNOSIS — N18.30 STAGE 3 CHRONIC KIDNEY DISEASE, UNSPECIFIED WHETHER STAGE 3A OR 3B CKD (HCC): ICD-10-CM

## 2024-06-11 DIAGNOSIS — E87.0 HYPERNATREMIA: ICD-10-CM

## 2024-06-11 DIAGNOSIS — I12.9 HYPERTENSIVE RENAL DISEASE: ICD-10-CM

## 2024-06-11 LAB
ALBUMIN SERPL-MCNC: 4.1 GM/DL (ref 3.4–5)
ANION GAP SERPL CALCULATED.3IONS-SCNC: 15 MMOL/L (ref 7–16)
BACTERIA: NEGATIVE /HPF
BILIRUBIN, URINE: NEGATIVE MG/DL
BLOOD, URINE: ABNORMAL
BUN SERPL-MCNC: 26 MG/DL (ref 6–23)
CALCIUM SERPL-MCNC: 8.4 MG/DL (ref 8.3–10.6)
CHLORIDE BLD-SCNC: 103 MMOL/L (ref 99–110)
CLARITY: CLEAR
CO2: 21 MMOL/L (ref 21–32)
COLOR: YELLOW
CREAT SERPL-MCNC: 1.2 MG/DL (ref 0.6–1.1)
CREATININE URINE: 79.1 MG/DL (ref 28–217)
GFR, ESTIMATED: 48 ML/MIN/1.73M2
GLUCOSE SERPL-MCNC: 86 MG/DL (ref 70–99)
GLUCOSE URINE: NEGATIVE MG/DL
KETONES, URINE: NEGATIVE MG/DL
LEUKOCYTE ESTERASE, URINE: ABNORMAL
NITRITE URINE, QUANTITATIVE: NEGATIVE
PH, URINE: 6 (ref 5–8)
PHOSPHORUS: 3.6 MG/DL (ref 2.5–4.9)
POTASSIUM SERPL-SCNC: 5 MMOL/L (ref 3.5–5.1)
PROT/CREAT RATIO, UR: 0.4
PROTEIN UA: NEGATIVE MG/DL
RBC URINE: 16 /HPF (ref 0–6)
SODIUM BLD-SCNC: 139 MMOL/L (ref 135–145)
SPECIFIC GRAVITY UA: 1.01 (ref 1–1.03)
SQUAMOUS EPITHELIAL: 9 /HPF
TRICHOMONAS: ABNORMAL /HPF
URINE TOTAL PROTEIN: 31.9 MG/DL
UROBILINOGEN, URINE: 0.2 MG/DL (ref 0.2–1)
WBC UA: 277 /HPF (ref 0–5)
YEAST: ABNORMAL /HPF

## 2024-06-11 PROCEDURE — 80069 RENAL FUNCTION PANEL: CPT

## 2024-06-11 PROCEDURE — 84156 ASSAY OF PROTEIN URINE: CPT

## 2024-06-11 PROCEDURE — 36415 COLL VENOUS BLD VENIPUNCTURE: CPT

## 2024-06-11 PROCEDURE — 81001 URINALYSIS AUTO W/SCOPE: CPT

## 2024-06-11 PROCEDURE — 87086 URINE CULTURE/COLONY COUNT: CPT

## 2024-06-11 PROCEDURE — 76775 US EXAM ABDO BACK WALL LIM: CPT

## 2024-06-11 PROCEDURE — 82570 ASSAY OF URINE CREATININE: CPT

## 2024-06-13 LAB
CULTURE: NORMAL
Lab: NORMAL
SPECIMEN: NORMAL

## 2024-07-11 ENCOUNTER — HOSPITAL ENCOUNTER (OUTPATIENT)
Age: 74
Discharge: HOME | End: 2024-07-11
Payer: MEDICARE

## 2024-07-11 ENCOUNTER — APPOINTMENT (OUTPATIENT)
Dept: CT IMAGING | Age: 74
End: 2024-07-11
Attending: INTERNAL MEDICINE
Payer: MEDICARE

## 2024-07-11 LAB
ALBUMIN SERPL-MCNC: 4.1 GM/DL (ref 3.4–5)
ANION GAP SERPL CALCULATED.3IONS-SCNC: 13 MMOL/L (ref 7–16)
BACTERIA: NEGATIVE /HPF
BILIRUBIN, URINE: NEGATIVE MG/DL
BLOOD, URINE: ABNORMAL
BUN SERPL-MCNC: 34 MG/DL (ref 6–23)
CALCIUM SERPL-MCNC: 9.1 MG/DL (ref 8.3–10.6)
CHLORIDE BLD-SCNC: 112 MMOL/L (ref 99–110)
CLARITY, UA: ABNORMAL
CO2: 21 MMOL/L (ref 21–32)
COLOR, UA: YELLOW
CREAT SERPL-MCNC: 1.2 MG/DL (ref 0.6–1.1)
GFR, ESTIMATED: 48 ML/MIN/1.73M2
GLUCOSE SERPL-MCNC: 73 MG/DL (ref 70–99)
GLUCOSE URINE: NEGATIVE MG/DL
KETONES, URINE: NEGATIVE MG/DL
LEUKOCYTE ESTERASE, URINE: ABNORMAL
NITRITE URINE, QUANTITATIVE: NEGATIVE
PH, URINE: 5.5 (ref 5–8)
PHOSPHORUS: 3.5 MG/DL (ref 2.5–4.9)
POTASSIUM SERPL-SCNC: 4.9 MMOL/L (ref 3.5–5.1)
PROTEIN UA: 30 MG/DL
RBC URINE: 30 /HPF (ref 0–6)
SODIUM BLD-SCNC: 146 MMOL/L (ref 135–145)
SPECIFIC GRAVITY UA: 1.02 (ref 1–1.03)
SQUAMOUS EPITHELIAL: 24 /HPF
TRICHOMONAS: ABNORMAL /HPF
UROBILINOGEN, URINE: 0.2 MG/DL (ref 0.2–1)
WBC CLUMP: ABNORMAL /HPF
WBC UA: 1389 /HPF (ref 0–5)

## 2024-07-11 PROCEDURE — 80069 RENAL FUNCTION PANEL: CPT

## 2024-07-11 PROCEDURE — 36415 COLL VENOUS BLD VENIPUNCTURE: CPT

## 2024-07-11 PROCEDURE — 87106 FUNGI IDENTIFICATION YEAST: CPT

## 2024-07-11 PROCEDURE — 87086 URINE CULTURE/COLONY COUNT: CPT

## 2024-07-11 PROCEDURE — 81001 URINALYSIS AUTO W/SCOPE: CPT

## 2024-07-11 NOTE — RESULT ENCOUNTER NOTE
TELL PT;  -DID YOU SCHEDULE THE CT OF THE ABDOMEN  -IT LOOKS LIKE YOU DID THE BLOODWORK FOR THE 4MONTH VISIT, TODAY.  -IT IS POSSIBLE THAT YOU MAY HAVE A UTI,  BASED ON THE URINE STUDY SO I AM WAITING FOR THE CULTURE.  ARE YOU HAVING ANY BURNING WITH URINATION?    THX

## 2024-07-12 RX ORDER — CEPHALEXIN 500 MG/1
1000 CAPSULE ORAL 2 TIMES DAILY
Qty: 28 CAPSULE | Refills: 0 | Status: SHIPPED | OUTPATIENT
Start: 2024-07-12

## 2024-07-12 NOTE — RESULT ENCOUNTER NOTE
TELL PT:  -KEFLEX SENT TO PHARMACY  -THE URINE CULTURE WILL TELL ME IF IT IS THE RIGHT ABX OR NOT.  IF NOT THEN I WILL HAVE TO SEND ANOTHER ONE, ONCE THE URINE CULTURE IS BACK    THX    Looks like it is hard to reach her on the phone, confirm her phone number

## 2024-07-13 LAB
CULTURE: ABNORMAL
CULTURE: ABNORMAL
Lab: ABNORMAL
SPECIMEN: ABNORMAL

## 2024-07-13 NOTE — PROGRESS NOTES
UCX WITH 50,000CFU/ML OF CANDIDA ALBICANS BUT PT IS NOT NEUTROPENIC AND IS NOT PLANNING AN UPCOMING UROLOGICAL PROCEDURE AND SHE IS NOT OF A LOW BIRTHWEIGHT INFANT SO, PER THE GUIDELINES, NO TREATMENT IS NEEDED    ALSO PT IS ALLERGIC TO FLUCONAZOLE

## 2024-07-29 ENCOUNTER — HOSPITAL ENCOUNTER (OUTPATIENT)
Dept: CT IMAGING | Age: 74
Discharge: HOME | End: 2024-07-29
Attending: INTERNAL MEDICINE
Payer: MEDICARE

## 2024-07-29 DIAGNOSIS — N28.89 DILATED RENAL PELVIS: ICD-10-CM

## 2024-07-29 DIAGNOSIS — N18.30 STAGE 3 CHRONIC KIDNEY DISEASE, UNSPECIFIED WHETHER STAGE 3A OR 3B CKD (HCC): ICD-10-CM

## 2024-07-29 PROCEDURE — 74176 CT ABD & PELVIS W/O CONTRAST: CPT

## 2024-07-30 NOTE — RESULT ENCOUNTER NOTE
TELL PT:  -THERE IS NO SWELLING OF THE KIDNEYS  -THERE ARE MULTIPLE KIDNEY STONES BUT THEY ARE NOT OBSTRUCTING THE KIDNEYS  -SEE YOU AT NEXT VISIT  THX    Ask who her nephrologist is and fax them a copy of the CT A/P

## 2025-05-29 ENCOUNTER — HOSPITAL ENCOUNTER (INPATIENT)
Age: 75
LOS: 1 days | Discharge: ANOTHER HOSPITAL | End: 2025-05-30
Attending: INTERNAL MEDICINE | Admitting: INTERNAL MEDICINE
Payer: MEDICARE

## 2025-05-29 ENCOUNTER — APPOINTMENT (OUTPATIENT)
Dept: GENERAL RADIOLOGY | Age: 75
End: 2025-05-29
Payer: MEDICARE

## 2025-05-29 DIAGNOSIS — N17.9 AKI (ACUTE KIDNEY INJURY): ICD-10-CM

## 2025-05-29 DIAGNOSIS — M25.561 ACUTE PAIN OF RIGHT KNEE: Primary | ICD-10-CM

## 2025-05-29 LAB
ANION GAP SERPL CALCULATED.3IONS-SCNC: 14 MMOL/L (ref 9–17)
BASOPHILS # BLD: 0.05 K/UL
BASOPHILS NFR BLD: 1 % (ref 0–1)
BUN SERPL-MCNC: 33 MG/DL (ref 7–20)
CALCIUM SERPL-MCNC: 9.1 MG/DL (ref 8.3–10.6)
CHLORIDE SERPL-SCNC: 106 MMOL/L (ref 99–110)
CO2 SERPL-SCNC: 23 MMOL/L (ref 21–32)
CREAT SERPL-MCNC: 2.8 MG/DL (ref 0.6–1.2)
EOSINOPHIL # BLD: 0.15 K/UL
EOSINOPHILS RELATIVE PERCENT: 2 % (ref 0–3)
ERYTHROCYTE [DISTWIDTH] IN BLOOD BY AUTOMATED COUNT: 14.3 % (ref 11.7–14.9)
GFR, ESTIMATED: 17 ML/MIN/1.73M2
GLUCOSE SERPL-MCNC: 99 MG/DL (ref 74–99)
HCT VFR BLD AUTO: 43.3 % (ref 37–47)
HGB BLD-MCNC: 13.7 G/DL (ref 12.5–16)
IMM GRANULOCYTES # BLD AUTO: 0.03 K/UL
IMM GRANULOCYTES NFR BLD: 0 %
LYMPHOCYTES NFR BLD: 1.51 K/UL
LYMPHOCYTES RELATIVE PERCENT: 17 % (ref 24–44)
MCH RBC QN AUTO: 31.6 PG (ref 27–31)
MCHC RBC AUTO-ENTMCNC: 31.6 G/DL (ref 32–36)
MCV RBC AUTO: 99.8 FL (ref 78–100)
MONOCYTES NFR BLD: 0.76 K/UL
MONOCYTES NFR BLD: 8 % (ref 0–5)
NEUTROPHILS NFR BLD: 72 % (ref 36–66)
NEUTS SEG NFR BLD: 6.58 K/UL
PLATELET # BLD AUTO: 215 K/UL (ref 140–440)
PMV BLD AUTO: 9.6 FL (ref 7.5–11.1)
POTASSIUM SERPL-SCNC: 4.4 MMOL/L (ref 3.5–5.1)
RBC # BLD AUTO: 4.34 M/UL (ref 4.2–5.4)
SODIUM SERPL-SCNC: 143 MMOL/L (ref 136–145)
WBC OTHER # BLD: 9.1 K/UL (ref 4–10.5)

## 2025-05-29 PROCEDURE — 73560 X-RAY EXAM OF KNEE 1 OR 2: CPT

## 2025-05-29 PROCEDURE — 80048 BASIC METABOLIC PNL TOTAL CA: CPT

## 2025-05-29 PROCEDURE — 99285 EMERGENCY DEPT VISIT HI MDM: CPT

## 2025-05-29 PROCEDURE — 85025 COMPLETE CBC W/AUTO DIFF WBC: CPT

## 2025-05-29 RX ORDER — LIDOCAINE HYDROCHLORIDE AND EPINEPHRINE BITARTRATE 20; .01 MG/ML; MG/ML
20 INJECTION, SOLUTION SUBCUTANEOUS ONCE
Status: DISCONTINUED | OUTPATIENT
Start: 2025-05-29 | End: 2025-05-30 | Stop reason: HOSPADM

## 2025-05-29 RX ORDER — SODIUM CHLORIDE 9 MG/ML
INJECTION, SOLUTION INTRAVENOUS CONTINUOUS
Status: DISCONTINUED | OUTPATIENT
Start: 2025-05-29 | End: 2025-05-30 | Stop reason: HOSPADM

## 2025-05-29 RX ORDER — 0.9 % SODIUM CHLORIDE 0.9 %
1000 INTRAVENOUS SOLUTION INTRAVENOUS ONCE
Status: COMPLETED | OUTPATIENT
Start: 2025-05-29 | End: 2025-05-30

## 2025-05-29 ASSESSMENT — PAIN - FUNCTIONAL ASSESSMENT: PAIN_FUNCTIONAL_ASSESSMENT: 0-10

## 2025-05-29 ASSESSMENT — PAIN SCALES - GENERAL: PAINLEVEL_OUTOF10: 0

## 2025-05-29 NOTE — ED TRIAGE NOTES
Pt reports tripping on a chair at her house and landed on her right knee. Pt believes her prosthesis \"moved\" in her leg and created a large laceration. 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 EMS states that there was a large amount of bleeding on scene but is now controlled.

## 2025-05-30 ENCOUNTER — APPOINTMENT (OUTPATIENT)
Dept: CT IMAGING | Age: 75
End: 2025-05-30
Payer: MEDICARE

## 2025-05-30 VITALS
RESPIRATION RATE: 17 BRPM | WEIGHT: 273.37 LBS | DIASTOLIC BLOOD PRESSURE: 84 MMHG | SYSTOLIC BLOOD PRESSURE: 128 MMHG | HEART RATE: 95 BPM | BODY MASS INDEX: 53.67 KG/M2 | HEIGHT: 60 IN | TEMPERATURE: 97.9 F | OXYGEN SATURATION: 96 %

## 2025-05-30 PROBLEM — M81.0 AGE-RELATED OSTEOPOROSIS WITHOUT CURRENT PATHOLOGICAL FRACTURE: Chronic | Status: ACTIVE | Noted: 2022-03-28

## 2025-05-30 PROBLEM — Q21.12 PFO (PATENT FORAMEN OVALE): Status: ACTIVE | Noted: 2020-11-19

## 2025-05-30 PROBLEM — N18.32 STAGE 3B CHRONIC KIDNEY DISEASE (HCC): Chronic | Status: ACTIVE | Noted: 2020-06-18
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
## 2025-05-30 PROBLEM — T84.50XA PROSTHETIC JOINT INFECTION: Status: ACTIVE | Noted: 2018-03-02
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
## 2025-05-30 LAB
ANION GAP SERPL CALCULATED.3IONS-SCNC: 12 MMOL/L (ref 9–17)
BUN SERPL-MCNC: 19 MG/DL (ref 7–20)
CALCIUM SERPL-MCNC: 8 MG/DL (ref 8.3–10.6)
CHLORIDE SERPL-SCNC: 108 MMOL/L (ref 99–110)
CO2 SERPL-SCNC: 19 MMOL/L (ref 21–32)
CREAT SERPL-MCNC: 0.9 MG/DL (ref 0.6–1.2)
GFR, ESTIMATED: 59 ML/MIN/1.73M2
GLUCOSE SERPL-MCNC: 133 MG/DL (ref 74–99)
LACTATE BLDV-SCNC: 1.3 MMOL/L (ref 0.4–2)
LACTATE BLDV-SCNC: 1.7 MMOL/L (ref 0.4–2)
POTASSIUM SERPL-SCNC: 3.8 MMOL/L (ref 3.5–5.1)
SODIUM SERPL-SCNC: 139 MMOL/L (ref 136–145)

## 2025-05-30 PROCEDURE — 87040 BLOOD CULTURE FOR BACTERIA: CPT

## 2025-05-30 PROCEDURE — 99223 1ST HOSP IP/OBS HIGH 75: CPT | Performed by: ORTHOPAEDIC SURGERY

## 2025-05-30 PROCEDURE — 94761 N-INVAS EAR/PLS OXIMETRY MLT: CPT

## 2025-05-30 PROCEDURE — 1200000000 HC SEMI PRIVATE

## 2025-05-30 PROCEDURE — 2580000003 HC RX 258: Performed by: PHYSICIAN ASSISTANT

## 2025-05-30 PROCEDURE — 6370000000 HC RX 637 (ALT 250 FOR IP): Performed by: INTERNAL MEDICINE

## 2025-05-30 PROCEDURE — 76937 US GUIDE VASCULAR ACCESS: CPT

## 2025-05-30 PROCEDURE — 73700 CT LOWER EXTREMITY W/O DYE: CPT

## 2025-05-30 PROCEDURE — 6360000002 HC RX W HCPCS: Performed by: INTERNAL MEDICINE

## 2025-05-30 PROCEDURE — 87641 MR-STAPH DNA AMP PROBE: CPT

## 2025-05-30 PROCEDURE — 83605 ASSAY OF LACTIC ACID: CPT

## 2025-05-30 PROCEDURE — 2580000003 HC RX 258: Performed by: INTERNAL MEDICINE

## 2025-05-30 PROCEDURE — 80048 BASIC METABOLIC PNL TOTAL CA: CPT

## 2025-05-30 PROCEDURE — 2500000003 HC RX 250 WO HCPCS: Performed by: INTERNAL MEDICINE

## 2025-05-30 PROCEDURE — 36415 COLL VENOUS BLD VENIPUNCTURE: CPT

## 2025-05-30 PROCEDURE — 94640 AIRWAY INHALATION TREATMENT: CPT

## 2025-05-30 RX ORDER — SODIUM CHLORIDE 9 MG/ML
INJECTION, SOLUTION INTRAVENOUS PRN
Status: DISCONTINUED | OUTPATIENT
Start: 2025-05-30 | End: 2025-05-30 | Stop reason: HOSPADM

## 2025-05-30 RX ORDER — ONDANSETRON 4 MG/1
4 TABLET, ORALLY DISINTEGRATING ORAL EVERY 8 HOURS PRN
Status: DISCONTINUED | OUTPATIENT
Start: 2025-05-30 | End: 2025-05-30 | Stop reason: HOSPADM

## 2025-05-30 RX ORDER — IPRATROPIUM BROMIDE 42 UG/1
2 SPRAY, METERED NASAL 3 TIMES DAILY
Status: DISCONTINUED | OUTPATIENT
Start: 2025-05-30 | End: 2025-05-30 | Stop reason: HOSPADM

## 2025-05-30 RX ORDER — SODIUM CHLORIDE 0.9 % (FLUSH) 0.9 %
5-40 SYRINGE (ML) INJECTION EVERY 12 HOURS SCHEDULED
Status: DISCONTINUED | OUTPATIENT
Start: 2025-05-30 | End: 2025-05-30 | Stop reason: HOSPADM

## 2025-05-30 RX ORDER — VITAMIN B COMPLEX
5000 TABLET ORAL DAILY
Status: DISCONTINUED | OUTPATIENT
Start: 2025-05-30 | End: 2025-05-30 | Stop reason: HOSPADM

## 2025-05-30 RX ORDER — ACETAMINOPHEN 650 MG/1
650 SUPPOSITORY RECTAL EVERY 6 HOURS PRN
Status: DISCONTINUED | OUTPATIENT
Start: 2025-05-30 | End: 2025-05-30 | Stop reason: HOSPADM

## 2025-05-30 RX ORDER — DILTIAZEM HYDROCHLORIDE 180 MG/1
180 CAPSULE, COATED, EXTENDED RELEASE ORAL DAILY
Status: DISCONTINUED | OUTPATIENT
Start: 2025-05-30 | End: 2025-05-30 | Stop reason: HOSPADM

## 2025-05-30 RX ORDER — LEVOFLOXACIN 250 MG/1
250 TABLET, FILM COATED ORAL EVERY OTHER DAY
COMMUNITY

## 2025-05-30 RX ORDER — METOPROLOL TARTRATE 50 MG
50 TABLET ORAL 2 TIMES DAILY
Status: DISCONTINUED | OUTPATIENT
Start: 2025-05-30 | End: 2025-05-30 | Stop reason: HOSPADM

## 2025-05-30 RX ORDER — LEVOTHYROXINE SODIUM 25 UG/1
25 TABLET ORAL DAILY
COMMUNITY
End: 2025-05-30

## 2025-05-30 RX ORDER — TRAZODONE HYDROCHLORIDE 50 MG/1
50 TABLET ORAL NIGHTLY PRN
Status: DISCONTINUED | OUTPATIENT
Start: 2025-05-30 | End: 2025-05-30 | Stop reason: HOSPADM

## 2025-05-30 RX ORDER — PRAVASTATIN SODIUM 40 MG
40 TABLET ORAL NIGHTLY
Status: DISCONTINUED | OUTPATIENT
Start: 2025-05-30 | End: 2025-05-30 | Stop reason: HOSPADM

## 2025-05-30 RX ORDER — VENLAFAXINE HYDROCHLORIDE 37.5 MG/1
75 CAPSULE, EXTENDED RELEASE ORAL DAILY
Status: DISCONTINUED | OUTPATIENT
Start: 2025-05-30 | End: 2025-05-30 | Stop reason: HOSPADM

## 2025-05-30 RX ORDER — ALBUTEROL SULFATE 90 UG/1
2 INHALANT RESPIRATORY (INHALATION) EVERY 6 HOURS PRN
Status: DISCONTINUED | OUTPATIENT
Start: 2025-05-30 | End: 2025-05-30 | Stop reason: HOSPADM

## 2025-05-30 RX ORDER — BUSPIRONE HYDROCHLORIDE 5 MG/1
5 TABLET ORAL 2 TIMES DAILY
Status: DISCONTINUED | OUTPATIENT
Start: 2025-05-30 | End: 2025-05-30 | Stop reason: HOSPADM

## 2025-05-30 RX ORDER — SODIUM CHLORIDE 0.9 % (FLUSH) 0.9 %
5-40 SYRINGE (ML) INJECTION PRN
Status: DISCONTINUED | OUTPATIENT
Start: 2025-05-30 | End: 2025-05-30 | Stop reason: HOSPADM

## 2025-05-30 RX ORDER — PANTOPRAZOLE SODIUM 20 MG/1
20 TABLET, DELAYED RELEASE ORAL
Status: DISCONTINUED | OUTPATIENT
Start: 2025-05-30 | End: 2025-05-30 | Stop reason: HOSPADM

## 2025-05-30 RX ORDER — BUSPIRONE HYDROCHLORIDE 5 MG/1
5 TABLET ORAL 2 TIMES DAILY
COMMUNITY

## 2025-05-30 RX ORDER — ACETAMINOPHEN 325 MG/1
650 TABLET ORAL EVERY 6 HOURS PRN
Status: DISCONTINUED | OUTPATIENT
Start: 2025-05-30 | End: 2025-05-30 | Stop reason: HOSPADM

## 2025-05-30 RX ORDER — BUDESONIDE AND FORMOTEROL FUMARATE DIHYDRATE 80; 4.5 UG/1; UG/1
1 AEROSOL RESPIRATORY (INHALATION)
Status: DISCONTINUED | OUTPATIENT
Start: 2025-05-30 | End: 2025-05-30 | Stop reason: HOSPADM

## 2025-05-30 RX ORDER — LEVOFLOXACIN 500 MG/1
250 TABLET, FILM COATED ORAL EVERY OTHER DAY
Status: DISCONTINUED | OUTPATIENT
Start: 2025-05-30 | End: 2025-05-30 | Stop reason: HOSPADM

## 2025-05-30 RX ORDER — MONTELUKAST SODIUM 10 MG/1
10 TABLET ORAL NIGHTLY
Status: DISCONTINUED | OUTPATIENT
Start: 2025-05-30 | End: 2025-05-30 | Stop reason: HOSPADM

## 2025-05-30 RX ORDER — POLYETHYLENE GLYCOL 3350 17 G/17G
17 POWDER, FOR SOLUTION ORAL DAILY PRN
Status: DISCONTINUED | OUTPATIENT
Start: 2025-05-30 | End: 2025-05-30 | Stop reason: HOSPADM

## 2025-05-30 RX ORDER — OXYCODONE AND ACETAMINOPHEN 5; 325 MG/1; MG/1
1 TABLET ORAL EVERY 8 HOURS PRN
Status: DISCONTINUED | OUTPATIENT
Start: 2025-05-30 | End: 2025-05-30 | Stop reason: HOSPADM

## 2025-05-30 RX ORDER — ONDANSETRON 2 MG/ML
4 INJECTION INTRAMUSCULAR; INTRAVENOUS EVERY 6 HOURS PRN
Status: DISCONTINUED | OUTPATIENT
Start: 2025-05-30 | End: 2025-05-30 | Stop reason: HOSPADM

## 2025-05-30 RX ADMIN — OXYCODONE AND ACETAMINOPHEN 1 TABLET: 325; 5 TABLET ORAL at 18:27

## 2025-05-30 RX ADMIN — LEVOFLOXACIN 250 MG: 500 TABLET, FILM COATED ORAL at 09:21

## 2025-05-30 RX ADMIN — PANTOPRAZOLE SODIUM 20 MG: 20 TABLET, DELAYED RELEASE ORAL at 06:17

## 2025-05-30 RX ADMIN — BUDESONIDE AND FORMOTEROL FUMARATE DIHYDRATE 1 PUFF: 80; 4.5 AEROSOL RESPIRATORY (INHALATION) at 09:00

## 2025-05-30 RX ADMIN — METOPROLOL TARTRATE 50 MG: 50 TABLET, FILM COATED ORAL at 09:04

## 2025-05-30 RX ADMIN — SODIUM CHLORIDE 1000 ML: 9 INJECTION, SOLUTION INTRAVENOUS at 00:03

## 2025-05-30 RX ADMIN — VENLAFAXINE HYDROCHLORIDE 75 MG: 37.5 CAPSULE, EXTENDED RELEASE ORAL at 09:04

## 2025-05-30 RX ADMIN — BUDESONIDE AND FORMOTEROL FUMARATE DIHYDRATE 1 PUFF: 80; 4.5 AEROSOL RESPIRATORY (INHALATION) at 19:58

## 2025-05-30 RX ADMIN — SODIUM CHLORIDE, PRESERVATIVE FREE 10 ML: 5 INJECTION INTRAVENOUS at 01:35

## 2025-05-30 RX ADMIN — SODIUM CHLORIDE: 0.9 INJECTION, SOLUTION INTRAVENOUS at 01:35

## 2025-05-30 RX ADMIN — VANCOMYCIN HYDROCHLORIDE 2000 MG: 5 INJECTION, POWDER, LYOPHILIZED, FOR SOLUTION INTRAVENOUS at 18:17

## 2025-05-30 RX ADMIN — OXYCODONE AND ACETAMINOPHEN 1 TABLET: 325; 5 TABLET ORAL at 02:11

## 2025-05-30 RX ADMIN — APIXABAN 5 MG: 5 TABLET, FILM COATED ORAL at 09:03

## 2025-05-30 RX ADMIN — IPRATROPIUM BROMIDE 2 SPRAY: 42 SPRAY, METERED NASAL at 10:19

## 2025-05-30 RX ADMIN — DILTIAZEM HYDROCHLORIDE 180 MG: 180 CAPSULE, COATED, EXTENDED RELEASE ORAL at 09:04

## 2025-05-30 RX ADMIN — MEROPENEM 1000 MG: 1 INJECTION, POWDER, FOR SOLUTION INTRAVENOUS at 16:38

## 2025-05-30 RX ADMIN — IPRATROPIUM BROMIDE 2 SPRAY: 42 SPRAY, METERED NASAL at 14:28

## 2025-05-30 RX ADMIN — Medication 5000 UNITS: at 09:05

## 2025-05-30 RX ADMIN — OXYCODONE AND ACETAMINOPHEN 1 TABLET: 325; 5 TABLET ORAL at 10:19

## 2025-05-30 RX ADMIN — BUSPIRONE HYDROCHLORIDE 5 MG: 5 TABLET ORAL at 09:03

## 2025-05-30 ASSESSMENT — PAIN SCALES - GENERAL
PAINLEVEL_OUTOF10: 6
PAINLEVEL_OUTOF10: 7
PAINLEVEL_OUTOF10: 7
PAINLEVEL_OUTOF10: 4
PAINLEVEL_OUTOF10: 4

## 2025-05-30 ASSESSMENT — PAIN DESCRIPTION - LOCATION
LOCATION: KNEE

## 2025-05-30 ASSESSMENT — PAIN DESCRIPTION - DESCRIPTORS
DESCRIPTORS: ACHING;DISCOMFORT
DESCRIPTORS: THROBBING
DESCRIPTORS: ACHING
DESCRIPTORS: ACHING;DISCOMFORT

## 2025-05-30 ASSESSMENT — PAIN DESCRIPTION - ORIENTATION
ORIENTATION: RIGHT
ORIENTATION: RIGHT
ORIENTATION: LEFT

## 2025-05-30 ASSESSMENT — PAIN DESCRIPTION - FREQUENCY: FREQUENCY: INTERMITTENT

## 2025-05-30 ASSESSMENT — PAIN - FUNCTIONAL ASSESSMENT: PAIN_FUNCTIONAL_ASSESSMENT: PREVENTS OR INTERFERES SOME ACTIVE ACTIVITIES AND ADLS

## 2025-05-30 ASSESSMENT — PAIN DESCRIPTION - ONSET: ONSET: ON-GOING

## 2025-05-30 ASSESSMENT — PAIN DESCRIPTION - PAIN TYPE: TYPE: ACUTE PAIN

## 2025-05-30 NOTE — DISCHARGE SUMMARY
V2.0  Discharge Summary    Name:  Odessa Dodson /Age/Sex: 1950 (75 y.o. female)   Admit Date: 2025  Discharge Date: 25    MRN & CSN:  [id number] & [id number] Encounter Date and Time 25 5:29 PM EDT    Attending:  Edyta Bhardwaj MD Discharging Provider: EDYTA BHARDWAJ MD       Hospital Course:     Brief HPI: Odessa Dodson is a 75 y.o. female who presented with ***    Brief Problem Based Course:   ***      The patient expressed appropriate understanding of, and agreement with the discharge recommendations, medications, and plan.     Consults this admission:  IP CONSULT TO ORTHOPEDIC SURGERY  IP CONSULT TO CASE MANAGEMENT  IP CONSULT TO VASCULAR ACCESS TEAM  IP CONSULT TO PHARMACY    Discharge Diagnosis:   Fall at home, initial encounter    ***    Discharge Instruction:   Follow up appointments: ***  Primary care physician: Lissette Blevins MD within 1 week  Diet: {diet:79541}   Activity: {discharge activity:15257}  Disposition: Discharged to:   []Home, [][facility], []SNF, []Acute Rehab, []Hospice ***  Condition on discharge: Stable  Labs and Tests to be Followed up as an outpatient by PCP or Specialist: ***    Discharge Medications:        Medication List        ASK your doctor about these medications      Acetaminophen Extra Strength 500 MG Tabs     albuterol sulfate  (90 Base) MCG/ACT inhaler  Commonly known as: PROVENTIL;VENTOLIN;PROAIR     alendronate 70 MG tablet  Commonly known as: FOSAMAX     apixaban 5 MG Tabs tablet  Commonly known as: ELIQUIS     azelastine HCl 0.15 % Soln  2 sprays by Nasal route daily     Biotin 5000 MCG Caps     busPIRone 5 MG tablet  Commonly known as: BUSPAR     dilTIAZem 180 MG extended release capsule  Commonly known as: CARDIZEM CD     Doxepin HCl 6 MG Tabs     ferrous sulfate 325 (65 Fe) MG tablet  Commonly known as: IRON 325     fluticasone-umeclidin-vilant 100-62.5-25 MCG/INH Aepb  Commonly known as: TRELEGY ELLIPTA     ipratropium 0.03 % nasal

## 2025-05-30 NOTE — PROGRESS NOTES
[facility]   [phone]     Dr. Regulo Osorio  [facility] [place]   [phone]         (ABNORMAL) Culture tissue with gram stain (06/22/2022 3:51 PM EDT)  Lab Results - (ABNORMAL) Culture tissue with gram stain (06/22/2022 3:51 PM EDT)  Component Value Ref Range Test Method Analysis Time Performed At Pathologist Signature   Culture, Tissue Rare Serratia marcescens (A)     06/25/2022 3:05 PM EDT [facility]     Comment:  The organism value for this result has been updated. These results have been appended to the previously preliminary verified report.  This is an edited result. Previous organism was Gram negative bacilli on 6/24/2022 at 1458 EDT.   Gram Stain Result No Polymorphonuclear leukocytes   ADRIENNE  06/25/2022 3:05 PM EDT [facility]     Comment: This is an appended report. These results have been appended to a previously preliminary verified report.   Gram Stain Result No Epithelial cells     06/25/2022 3:05 PM EDT [facility]     Comment: This is an appended report. These results have been appended to a previously preliminary verified report.   Gram Stain Result No organisms seen     06/25/2022 3:05 PM EDT [facility]     Comment: This is an appended report. These results have been appended to a previously preliminary verified report.     Lab Results - (ABNORMAL) Culture tissue with gram stain (06/22/2022 3:51 PM EDT)  Specimen (Source) Anatomical Location / Laterality Collection Method / Volume Collection Time Received Time   Tissue Structure of left knee region / Unknown   06/22/2022 3:51 PM EDT 06/22/2022 4:16 PM EDT     Lab Results - (ABNORMAL) Culture tissue with gram stain (06/22/2022 3:51 PM EDT)  Narrative         Lab Results - (ABNORMAL) Culture tissue with gram stain (06/22/2022 3:51 PM EDT)  Organism Antibiotic Method Susceptibility   Serratia marcescens Cefazolin ADRIENNE

## 2025-05-30 NOTE — ED NOTES
ED TO INPATIENT SBAR HANDOFF    Patient Name: Odessa Dodson   :  1950  75 y.o.   Preferred Name  Odessa  Family/Caregiver Present no   Restraints no   C-SSRS: Risk of Suicide: No Risk  Sitter no   Sepsis Risk Score Sepsis V2 Risk Score: 19.4      Situation  Chief Complaint   Patient presents with    Laceration     Brief Description of Patient's Condition: Pt fell on right knee at home. Pt has small lac. No fracture on XR. Pt unable to ambulate. Pt also has SUZY.   Mental Status: oriented  Arrived from: home    Imaging:   XR KNEE RIGHT (1-2 VIEWS)   Final Result        Abnormal labs:   Abnormal Labs Reviewed   CBC WITH AUTO DIFFERENTIAL - Abnormal; Notable for the following components:       Result Value    MCH 31.6 (*)     MCHC 31.6 (*)     Neutrophils % 72 (*)     Lymphocytes % 17 (*)     Monocytes % 8 (*)     All other components within normal limits   BASIC METABOLIC PANEL - Abnormal; Notable for the following components:    BUN 33 (*)     Creatinine 2.8 (*)     Est, Glom Filt Rate 17 (*)     All other components within normal limits        Background  History:   Past Medical History:   Diagnosis Date    Arthritis     ankles    Asthma     admitted  for 7 days    Atrial fibrillation ([facility])     CHF exacerbation ([facility]) 2015    Chronic fatigue 2016    Chronic venous hypertension with ulcer involving left side 2016    COPD (chronic obstructive pulmonary disease) ([facility]) 2014    DVT (deep venous thrombosis) ([facility])     Left leg    Gout     H/O 24 hour EKG monitoring 10/10/17,16    Abnormal Holter findings suggestive atrial fibrillation with poor rate control and clinically insignificant infrequent ventricular ectopy.     H/O cardiac catheterization 12    WNL EF 55%    H/O cardiovascular stress test 12    Mild ischemia in Mid Anterior region. Pt is in A-Fib. EF 68%    H/O echocardiogram 9/22/15     Mildly concentric LVH with mildly reduced global left ventricular systolic

## 2025-05-30 NOTE — PROGRESS NOTES
Per [facility] P&T committee policy, Trelegy ellipta 100 has been interchanged to available product by pharmacy directed action.    New order(s):  Symbicort 80    Please call with questions.  Thank you,    Hemant Barraza, PharmD, [facility]   Main Pharmacy [id number]  5/30/2025 1:23 AM

## 2025-05-30 NOTE — PROGRESS NOTES
V2.0    [facility] Progress Note      Name:  Odessa Dodson /Age/Sex: 1950  (75 y.o. female)   MRN & CSN:  [id number] & [id number] Encounter Date/Time: 2025 2:39 PM EDT   Location:  [address] [place] [address] PCP: Lissette Blevins MD     Attending:Tarun Sánchez MD       Hospital Day: 2    Assessment and Recommendations   Odessa Dodson is a 75 y.o. female with pmh of *** who presents with Fall at home, initial encounter      Plan:   ***      Diet ADULT DIET; Regular; Low Sodium (2 gm)   DVT Prophylaxis [] Lovenox, []  Heparin, [] SCDs, [] Ambulation,  [] Eliquis, [] Xarelto  [] Coumadin   Code Status Full Code   Disposition From: ***  Expected Disposition: ***  Estimated Date of Discharge: ***  Patient requires continued admission due to ***   Surrogate Decision Maker/ POA  ***     Personally reviewed Lab Studies and Imaging     Discussed management of the case with *** who recommended ***    EKG interpreted personally and results ***    Imaging that was interpreted personally includes *** and results ***    Drugs that require monitoring for toxicity include *** and the method of monitoring was ***        Subjective:     Chief Complaint: ***    Odessa Dodson is a 75 y.o. female who presents with ***      Review of Systems:      Pertinent positives and negatives discussed in HPI    Objective:     Intake/Output Summary (Last 24 hours) at 2025 1439  Last data filed at 2025 0530  Gross per 24 hour   Intake 10 ml   Output 800 ml   Net -790 ml      Vitals:   Vitals:    25 0830 25 0900 25 0940 25 1019   BP: 131/80  131/80    Pulse: 87 95 95    Resp:   17 17   Temp: 97.9 °F (36.6 °C)  97.9 °F (36.6 °C)    TempSrc: Oral  Oral    SpO2: 95%  95%    Weight:       Height:             Physical Exam:    ***  General: NAD  Eyes: EOMI  ENT: neck supple  Cardiovascular: Regular rate.  Respiratory: Clear to auscultation  Gastrointestinal: Soft, non tender  Genitourinary: no suprapubic

## 2025-05-30 NOTE — PROGRESS NOTES
4 Eyes Skin Assessment     NAME:  Odessa Dodson  YOB: 1950  MEDICAL RECORD NUMBER:  [id number]    The patient is being assessed for  Admission    I agree that at least one RN has performed a thorough Head to Toe Skin Assessment on the patient. ALL assessment sites listed below have been assessed.      Areas assessed by both nurses:    Head, Face, Ears, Shoulders, Back, Chest, Arms, Elbows, Hands, Sacrum. Buttock, Coccyx, Ischium, Legs. Feet and Heels, and Under Medical Devices         Does the Patient have a Wound? Yes wound(s) were present on assessment. LDA wound assessment was Initiated and completed by RN       Brock Prevention initiated by RN: Yes  Wound Care Orders initiated by RN: Yes    Pressure Injury (Stage 3,4, Unstageable, DTI, NWPT, and Complex wounds) if present, place Wound referral order by RN under : No    New Ostomies, if present place, Ostomy referral order under : No     Nurse 1 eSignature: Electronically signed by Chinyere Forde RN on 5/30/25 at 1:56 AM EDT    **SHARE this note so that the co-signing nurse can place an eSignature**    Nurse 2 eSignature: Electronically signed by Maine Harris LPN on 5/30/25 at 3:52 AM EDT

## 2025-05-30 NOTE — PROGRESS NOTES
Jj Alexandre, student nurse removed peripheral IV at [id number] due to redness, swelling, and pinching at IV site, patient tolerated well; no bleeding at the site. Attempted 2 times to insert a peripheral IV in the left forearm. Patient tolerated well, informed MARIANA Pineda unable to obtain IV access.

## 2025-05-30 NOTE — CONSULTS
PHARMACY VANCOMYCIN MONITORING SERVICE  Pharmacy consulted by Dr. Sánchez for monitoring and adjustment.    Indication for treatment: SSTI  Goal trough: Trough Goal: 10-15 mcg/mL  AUC/ADRIENNE: 400-600    Pertinent Laboratory Values:   Temp Readings from Last 3 Encounters:   05/30/25 97.9 °F (36.6 °C) (Oral)   03/13/25 96.9 °F (36.1 °C)   12/12/24 96.9 °F (36.1 °C)     Estimated Creatinine Clearance: 67 mL/min (based on SCr of 0.9 mg/dL).  Recent Labs     05/29/25 2205   WBC 9.1     Recent Labs     05/29/25  2205 05/30/25  0802   BUN 33* 19   CREATININE 2.8* 0.9       Intake/Output Summary (Last 24 hours) at 5/30/2025 1651  Last data filed at 5/30/2025 0530  Gross per 24 hour   Intake 10 ml   Output 800 ml   Net -790 ml     Last Encounter Weight:  Wt Readings from Last 3 Encounters:   05/30/25 124 kg (273 lb 5.9 oz)   05/08/25 124.7 kg (275 lb)   03/13/25 122.5 kg (270 lb)     Ideal body weight: 45.5 kg (100 lb 4.9 oz)  Adjusted ideal body weight: 76.9 kg (169 lb 8.5 oz)     Pertinent Cultures:   Date    Source    Results  5/30   Blood    Ordered   5/30   MRSA nasal   Ordered     Vancomycin level:   No results for input(s): \"VANCORANDOM\" in the last 72 hours.    Assessment:  HPI: The patient is a 75 y.o. female who presented with right knee pain after a fall, open wound overlying the patella with bleeding.  PMHx significant for afib and hx VTE on Eliquis, SUZY on CKDIII, HTN, COPD, MURTAZA, MURTAZA, depression/anxiety.   Scr, BUN, and urine output:   Scr 0.9 mg/dL, down from 2.8 yesterday  BUN WNL  UOP ok  Day(s) of therapy: 1 (TBD, patient to be transferred out)  Vancomycin concentration: TBD    Plan:  Give vancomycin loading dose of 2000 mg IV x once, followed by 1500 mg IV q24h  Regimen predicts ssAUC 532 mg/L*hr  Monitor for accumulation 2/2 BMI and CKD  Pharmacy will continue to monitor patient and adjust therapy as indicated    VANCOMYCIN CONCENTRATION SCHEDULED FOR 6/02 @ 0600    Thank you for the consult.  Remedios Byrd,

## 2025-05-30 NOTE — CONSULTS
[facility] Nurse  Consult Note       Odessa Dodson  AGE: 75 y.o.   GENDER: female  : 1950  TODAY'S DATE:  2025    Subjective:     Reason for  Evaluation and Assessment: wound care evalLu Dodson is a 75 y.o. female referred by:   [x] Physician  [] Nursing  [] Other:     Wound Identification:  Wound Type: traumatic and skin tear  Contributing Factors: edema, decreased mobility, obesity, and fall        PAST MEDICAL HISTORY        Diagnosis Date    Arthritis     ankles    Asthma     admitted  for 7 days    Atrial fibrillation (HCC)     CHF exacerbation ([facility]) 2015    Chronic venous hypertension with ulcer involving left side 2016    COPD (chronic obstructive pulmonary disease) ([facility]) 2014    DVT (deep venous thrombosis) ([facility])     Left leg    Gout     Hx of pulmonary embolus 2015 sees Dr Orr    Hyperlipidemia     Hypertension     Hypothyroidism     Kidney stones     4 total  -- last in early     Non-pressure chronic ulcer of left lower leg with fat layer exposed ([facility]) 2016    Obesity (BMI 35.0-39.9 without comorbidity)     Obstructive sleep apnea     with CPAP    S/P bariatric surgery 2016    done at [facility]. Dr Lau--100 lb wgt loss thus far (17)       PAST SURGICAL HISTORY    Past Surgical History:   Procedure Laterality Date    BARIATRIC SURGERY  2016    Gastric sleeve--  done in [place] per Dr. Lau    CARPAL TUNNEL RELEASE   & 2013    2009 right; 2013 left     FINGER SURGERY Left     5th finger    JOINT REPLACEMENT      bilateral knees--done 7 months apart    KNEE ARTHROSCOPY Bilateral 's    2 on Lt knee -- 1 on Rt knee    LITHOTRIPSY  2012    OTHER SURGICAL HISTORY  2016    I & D--Hematoma removal from left lower leg    OTHER SURGICAL HISTORY Left 2017    Left leg I&D    OTHER SURGICAL HISTORY  2017    Hematoma on left knee removed    PARATHYROIDECTOMY   cm 05/30/25 0819   Tunneling Position ___ O'Clock 0 05/30/25 0819   Undermining Starts ___ O'Clock 0 05/30/25 0819   Undermining Ends___ O'Clock 0 05/30/25 0819   Undermining Maxium Distance (cm) 0 05/30/25 0819   Wound Assessment Pink/red 05/30/25 0819   Drainage Amount Moderate (25-50%) 05/30/25 0819   Drainage Description Serosanguinous 05/30/25 0819   Odor None 05/30/25 0819   Erin-wound Assessment Intact;Edematous;Ecchymosis 05/30/25 0819   Margins Defined edges 05/30/25 0819   Wound Thickness Description not for Pressure Injury Partial thickness 05/30/25 0819   Number of days: 0       Response to treatment:  With complaints of pain.     Pain Assessment:  Severity:  mild  Quality of pain: rt knee sore  Wound Pain Timing/Severity: wound care  Premedicated: no    Plan:     Plan of Care: Wound 05/30/25 Knee Right;Anterior-Dressing/Treatment: Non adherent, Hydrofiber Ag, Silicone border    Patient in bed agreeable to wound care eval. Has a skin tear to the rt knee from a fall. Knee with edema and bruising. Cleansed with NS measured and pictured. Applied versatel/opticell ag/border. Heels intact. Pt is generally not at risk for skin breakdown AEB puma.     Specialty Bed Required : no  [] Low Air Loss   [] Pressure Redistribution  [] Fluid Immersion  [] Bariatric  [] Total Pressure Relief  [] Other:     Discharge Plan:  Placement for patient upon discharge: tbd  Hospice Care: no  Patient appropriate for Outpatient Wound Care Center: tbd    Patient/Caregiver Teaching:  Level of patient/caregiver understanding able to: pt voiced understanding.         Electronically signed by Tana Carroll RN, on 5/30/2025 at 8:22 AM

## 2025-05-30 NOTE — PROGRESS NOTES
Full consult to follow.    Patient admitted after mechanical fall.  History of knee replacement on the right knee.  Severe pain and swelling at the right knee.    X-rays inconclusive for fracture    Recommend CT scan for further evaluation possible occult nondisplaced fracture    Will evaluate patient following CT scan

## 2025-05-30 NOTE — ED PROVIDER NOTES
Food Insecurity (5/30/2025)    Hunger Vital Sign     Worried About Running Out of Food in the Last Year: Never true     Ran Out of Food in the Last Year: Never true   Transportation Needs: No Transportation Needs (5/30/2025)    PRAPARE - Transportation     Lack of Transportation (Medical): No     Lack of Transportation (Non-Medical): No   Physical Activity: Not on file   Stress: Not on file   Social Connections: Not on file   Intimate Partner Violence: Not on file   Housing Stability: Low Risk  (5/30/2025)    Housing Stability Vital Sign     Unable to Pay for Housing in the Last Year: No     Number of Times Moved in the Last Year: 0     Homeless in the Last Year: No     Current Facility-Administered Medications   Medication Dose Route Frequency Provider Last Rate Last Admin    sodium chloride flush 0.9 % injection 5-40 mL  5-40 mL IntraVENous 2 times per day Isrrael Bui MD        sodium chloride flush 0.9 % injection 5-40 mL  5-40 mL IntraVENous PRN Isrrael Bui MD   10 mL at 05/30/25 0135    0.9 % sodium chloride infusion   IntraVENous PRN Isrrael Bui MD        ondansetron (ZOFRAN-ODT) disintegrating tablet 4 mg  4 mg Oral Q8H PRN Isrrael Bui MD        Or    ondansetron (ZOFRAN) injection 4 mg  4 mg IntraVENous Q6H PRN Isrrael Bui MD        polyethylene glycol (GLYCOLAX) packet 17 g  17 g Oral Daily PRN Isrrael Bui MD        acetaminophen (TYLENOL) tablet 650 mg  650 mg Oral Q6H PRN Isrrael Bui MD        Or    acetaminophen (TYLENOL) suppository 650 mg  650 mg Rectal Q6H PRN Isrrael Bui MD        albuterol sulfate HFA (PROVENTIL;VENTOLIN;PROAIR) 108 (90 Base) MCG/ACT inhaler 2 puff  2 puff Inhalation Q6H PRIsrrael Reilly MD        apixaban (ELIQUIS) tablet 5 mg  5 mg Oral BID Isrrael Bui MD        busPIRone (BUSPAR) tablet 5 mg  5 mg Oral BID Isrrael Bui MD        Vitamin D (CHOLECALCIFEROL) tablet 5,000  punctuation, and spelling, as well as words and phrases that may be inappropriate. If there are any questions or concerns please feel free to contact the dictating provider for clarification.    Please note Images are personally interpreted by this Provider (EDOUARD Paez. )However final disposition is made with deference to Radiologist interpretation of said images.       \        Russ Paez PA-C  05/30/25 0225

## 2025-05-30 NOTE — PLAN OF CARE
Problem: Chronic Conditions and Co-morbidities  Goal: Patient's chronic conditions and co-morbidity symptoms are monitored and maintained or improved  5/30/2025 0939 by Miriam Keys LPN  Outcome: Progressing  5/30/2025 0153 by Chinyere Forde RN  Outcome: Progressing

## 2025-05-30 NOTE — CONSULTS
[FreeTextEntry1] : MRI demonstrates signs consistent with plantar fasciitis.  No full-thickness rupture is seen. dose optimization techniques (automated exposure control, and use of iterative reconstruction techniques, or adjustment of the mA and/or kV according to patient size) were used to limit patient radiation dose. Findings: Total knee arthroplasty is noted. Resultant streak artifact limits the exam. No definite lucency to suggest hardware loosening There is a mildly distracted fracture of the mid/lower pole junction of the patella. Distraction of fragments approximately 9 mm. Extensive overlying soft tissue swelling. Likely posttraumatic soft tissue gas in this region. Stranding anteriorly likely indicate some blood products as well. A measurable hematoma is  not appreciated. No asymmetric muscle atrophy     IMPRESSION:     1. Mildly distracted comminuted and transverse fracture of the mid/lower pole junction of the patella with likely overlying ill-defined hematoma and traumatic soft tissue gas 2. Total knee arthroplasty without definite evidence of hardware loosening        XR KNEE RIGHT (1-2 VIEWS)  Result Date: 5/29/2025    2 views of the right knee show evidence of a comminuted mildly distracted patella fracture incompletely evaluated on these views.  There is a total knee replacement in place with no evidence of compromise or substantial displacement or subsidence or loosening.    IMPRESSION:      Comminuted patella fracture      Assessment and Plan     1.  Comminuted displaced right open patella fracture    2.  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    I am concerned about the significant difficulty with the situation given her history of multiple revision surgeries and history of infection in this total knee prior.    Explained that she has very high risk for complications with healing this  issue.  I believe that she would benefit from irrigation and debridement of her open fracture site.  Additionally she would likely benefit from fixation of her fracture however this would come with a significant risk of infectious issue given her history of complex problems in this area.    This is a very difficult situation with a high risk of complications.  therefore I recommend she follow-up with her previous surgeon who performs a revision type surgeries.  This will give her the best chance of appropriate healing with minimized risk of complications through the healing process.    I have discussed this issue with the patient's medical provider and he is going to contact the patient's previous surgeon for likely transfer to higher level of care for surgical intervention.      Dave Killian MD

## 2025-05-30 NOTE — CARE COORDINATION
05/30/25 1213   Service Assessment   Patient Orientation Alert and Oriented   Cognition Alert   History Provided By Patient   Primary Caregiver Self   Support Systems Spouse/Significant Other;Children   Patient's Healthcare Decision Maker is: Legal Next of Kin   PCP Verified by CM Yes   Last Visit to PCP Within last 6 months   Prior Functional Level Independent in ADLs/IADLs   Current Functional Level Assistance with the following:;Bathing;Toileting;Mobility   Can patient return to prior living arrangement Yes   Ability to make needs known: Good   Family able to assist with home care needs: Yes   Would you like for me to discuss the discharge plan with any other family members/significant others, and if so, who? Yes   Financial Resources Medicare   Community Resources None     Discharge planning initiated. Patient is from home with . Patient independent prior to admission. Patient has PCP and insurance. Patient states she has all needed DME. She attends OP therapy at  for LE lymphedema. Patient is unsure of whether she wants HHC or SNF. She would like to see what imaging shows and how she does with PT/OT. CM will follow up with patient after therapy evals are in.

## 2025-05-30 NOTE — H&P
History and Physical      Name:  Odessa Dodson /Age/Sex: 1950  (75 y.o. female)   MRN & CSN:  [id number] & [id number] Encounter Date/Time: 2025 12:17 AM EDT   Location:  [facility] PCP: Lissette Blevins MD       Hospital Day: 2    Assessment and Plan:     #. Fall  - Right knee x-ray-no acute findings  - Fall precautions  - PT/OT evaluation  - Orthopedic surgery consult    #.  SUZY on CKD stage III  - BUN/creatinine-33/2.8, was 28/1.2-2025  -Continue IV fluids and repeat BMP  - No improvement consult nephrology-Dr. Fenton    #.  Persistent atrial fibrillation, on anticoagulation  - Continue Cardizem, metoprolol, Eliquis    #.  Hypertension  - On metoprolol, losartan  - Hold losartan due to SUZY    #.  COPD, not on home oxygen  - On Trelegy, albuterol, Singulair    #.  History of prosthetic joint infection  - On suppression therapy with levofloxacin to 50 mg qod    #.  History of DVT/PE - as per documentation    #.  MURTAZA on CPAP    #.  Osteoporosis-on alendronate    #.  Depression, anxiety  - On venlafaxine, buspirone    #.  Severe obesity with BMI 52.73    Disposition:   Current Living situation: Home with     Diet Low-salt diet   DVT Prophylaxis  [x] Eliquis   Code Status FULL   Surrogate Decision Maker/ POA      History from:   [facility], patient.    History of Present Illness:     Chief Complaint: Fall at home, initial encounter  Odessa Dodson is a 75 y.o. female with hypertension, persistent atrial fibrillation, on anticoagulation, COPD, not on home oxygen, MURTAZA on CPAP, depression, anxiety, severe obesity, CKD stage III presented to ED after having a fall at home.  Patient reported that she tripped over a chair and fell onto her knee over hardwood floor.  Patient was unable to get up by herself.  Laid on the floor for 45 minutes before her  could find her.  Patient reported having a small laceration and bleeding from her right knee.  Currently patient reports pain in her right knee with  appreciable M/R/G. Peripheral pulses equal bilaterally and palpable. No peripheral edema. No reproducible chest wall tenderness.   GI  -Abdomen is soft, non-distended, no significant tenderness. No masses or guarding. + BS in all quadrants. Rectal exam deferred.     -No CVA tenderness. Patel catheter is not present.  MS  -right knee with dressing intact.  SKIN  -Normal coloration, warm, dry.   NEURO  -awake, alert, oriented x 3, no focal deficits noted.    Past Medical History:   Reviewed patient's past medical, surgical, social, family history and allergies.      PMHx   Past Medical History:   Diagnosis Date    Arthritis     ankles    Asthma     admitted 9-2016 for 7 days    Atrial fibrillation ([facility])     CHF exacerbation ([facility]) 9/18/2015    Chronic fatigue 1/29/2016    Chronic venous hypertension with ulcer involving left side 1/19/2016    COPD (chronic obstructive pulmonary disease) ([facility]) 8/12/2014    DVT (deep venous thrombosis) ([facility]) 2009    Left leg    Gout     H/O 24 hour EKG monitoring 10/10/17,2/12/16    Abnormal Holter findings suggestive atrial fibrillation with poor rate control and clinically insignificant infrequent ventricular ectopy.     H/O cardiac catheterization 12/5/12    WNL EF 55%    H/O cardiovascular stress test 11/8/12    Mild ischemia in Mid Anterior region. Pt is in A-Fib. EF 68%    H/O echocardiogram 9/22/15     Mildly concentric LVH with mildly reduced global left ventricular systolic function, Ef 40-45% Mild pulmonary HTN.    Hx of blood clots     Hx of pulmonary embolus 8/18/2015 2008 sees Dr Orr    Hyperlipidemia     Hypertension     Hypothyroidism     Kidney stones     4 total  -- last in early 2017    Non-pressure chronic ulcer of left lower leg with fat layer exposed ([facility]) 1/19/2016    Obesity (BMI 35.0-39.9 without comorbidity)     Obstructive sleep apnea     with CPAP    Other activity(E029.9) 09/29/2011    48 hour Holter monitor. Abnormal Holter finding suggestive of

## 2025-05-31 LAB
MRSA, DNA, NASAL: NOT DETECTED
SPECIMEN DESCRIPTION: NORMAL

## 2025-06-01 LAB
MICROORGANISM SPEC CULT: NORMAL
MICROORGANISM SPEC CULT: NORMAL
SERVICE CMNT-IMP: NORMAL
SERVICE CMNT-IMP: NORMAL
SPECIMEN DESCRIPTION: NORMAL
SPECIMEN DESCRIPTION: NORMAL
